# Patient Record
Sex: FEMALE | Race: WHITE | NOT HISPANIC OR LATINO | Employment: OTHER | URBAN - METROPOLITAN AREA
[De-identification: names, ages, dates, MRNs, and addresses within clinical notes are randomized per-mention and may not be internally consistent; named-entity substitution may affect disease eponyms.]

---

## 2018-11-01 ENCOUNTER — OFFICE VISIT (OUTPATIENT)
Dept: OBGYN | Facility: CLINIC | Age: 73
End: 2018-11-01
Payer: COMMERCIAL

## 2018-11-01 VITALS
WEIGHT: 137 LBS | SYSTOLIC BLOOD PRESSURE: 136 MMHG | BODY MASS INDEX: 23.39 KG/M2 | RESPIRATION RATE: 18 BRPM | DIASTOLIC BLOOD PRESSURE: 85 MMHG | HEIGHT: 64 IN | TEMPERATURE: 98.1 F | HEART RATE: 84 BPM

## 2018-11-01 DIAGNOSIS — N81.10 VAGINAL WALL PROLAPSE: Primary | ICD-10-CM

## 2018-11-01 LAB

## 2018-11-01 PROCEDURE — 51798 US URINE CAPACITY MEASURE: CPT | Performed by: OBSTETRICS & GYNECOLOGY

## 2018-11-01 PROCEDURE — 81001 URINALYSIS AUTO W/SCOPE: CPT | Performed by: OBSTETRICS & GYNECOLOGY

## 2018-11-01 PROCEDURE — 99203 OFFICE O/P NEW LOW 30 MIN: CPT | Mod: 25 | Performed by: OBSTETRICS & GYNECOLOGY

## 2018-11-01 PROCEDURE — 87086 URINE CULTURE/COLONY COUNT: CPT | Performed by: OBSTETRICS & GYNECOLOGY

## 2018-11-01 PROCEDURE — 57160 INSERT PESSARY/OTHER DEVICE: CPT | Performed by: OBSTETRICS & GYNECOLOGY

## 2018-11-01 PROCEDURE — A4562 PESSARY, NON RUBBER,ANY TYPE: HCPCS | Performed by: OBSTETRICS & GYNECOLOGY

## 2018-11-01 RX ORDER — AMLODIPINE BESYLATE 5 MG/1
7.5 TABLET ORAL
COMMUNITY
Start: 2017-08-22

## 2018-11-01 RX ORDER — GLUCOSAMINE/CHONDR SU A SOD 750-600 MG
3000 TABLET ORAL
COMMUNITY
Start: 2017-08-22

## 2018-11-01 RX ORDER — DIPHENOXYLATE HYDROCHLORIDE AND ATROPINE SULFATE 2.5; .025 MG/1; MG/1
1 TABLET ORAL
COMMUNITY
Start: 2018-02-20

## 2018-11-01 NOTE — PROGRESS NOTES
Paz is a 73 year old   female who presents for advice regarding likely vaginal wall prolapse; she has a remote h/o some type of vaginal sling for incontinence, is a breast cancer pt, who has just finished 10 years of antiestrogen meds; she now is feeling tremendouss pressure, sees something protruding when upright; she has no incontinence, has trouble emptying her bladder; no nocturia, no urgency; she is not sexually active..    There are no active problems to display for this patient.      All systems were reviewed and pertinent information in noted in subjective/HPI.    History reviewed. No pertinent past medical history.    History reviewed. No pertinent surgical history.      Current Outpatient Prescriptions:      amLODIPine (NORVASC) 5 MG tablet, Take 7.5 mg by mouth, Disp: , Rfl:      CALCIUM-MAGNESIUM-ZINC PO, Take 1 tablet by mouth, Disp: , Rfl:      Glucosamine HCl 1500 MG TABS, Take 3,000 mg by mouth, Disp: , Rfl:      Multiple Vitamin (MULTI-VITAMINS) TABS, Take 1 tablet by mouth, Disp: , Rfl:      ALENDRONATE SODIUM PO, Take by mouth once a week On Saturday, Disp: , Rfl:      ciprofloxacin (CIPRO) 500 MG tablet, Take 1 tablet (500 mg) by mouth 2 times daily (Patient not taking: Reported on 2018), Disp: 6 tablet, Rfl: 0     Omega-3 Fatty Acids (OMEGA-3 FISH OIL PO), Take 1 g by mouth daily, Disp: , Rfl:      ondansetron (ZOFRAN ODT) 4 MG disintegrating tablet, Take 1 tablet (4 mg) by mouth every 8 hours as needed for nausea (Patient not taking: Reported on 2018), Disp: 7 tablet, Rfl: 0     TAMOXIFEN CITRATE PO, Take 20 mg by mouth daily , Disp: , Rfl:     ALLERGIES:  Atorvastatin; Ibuprofen; and Lisinopril    Social History     Social History     Marital status:      Spouse name: N/A     Number of children: N/A     Years of education: N/A     Social History Main Topics     Smoking status: Never Smoker     Smokeless tobacco: Never Used     Alcohol use None     Drug use: None      "Sexual activity: Not Asked     Other Topics Concern     None     Social History Narrative     None       History reviewed. No pertinent family history.    OBJECTIVE:  Vitals: /85 (BP Location: Left arm, Patient Position: Chair, Cuff Size: Adult Small)  Pulse 84  Temp 98.1  F (36.7  C) (Tympanic)  Resp 18  Ht 5' 4\" (1.626 m)  Wt 137 lb (62.1 kg)  BMI 23.52 kg/m2 BMI= Body mass index is 23.52 kg/(m^2).   No LMP recorded. Patient is postmenopausal.     GENERAL APPEARANCE: healthy, alert and no distress  ABDOMEN:  soft, nontender, no hepato-splenomegaly or hernias   PELVIC:  Urethra with small caruncle noted; atrophic tissue  With strain, 3+ cystocele; apex well supported  1+ rectocele    POSTVOID RESIDUAL= 458 ml    Fit with size 3 ring with support pessary that felt very comfortable, reduced the anterior prolapse and stayed in place with Valsalva and movement    ASSESSMENT:      ICD-10-CM    1. Vaginal wall prolapse N81.10        PLAN:  I discussed pessary use, need for interval cleaning every 3 months, potential use of Trimosan gel  I recommend she RETURN TO CLINIC in 1 weeks for recheck of POSTVOID RESIDUAL and symptoms  Tasha Simmons MD  [facility]  Duration of visit:  30 minutes, >50% in discussion of current issues, treatment options and treatment planning.  TASHA Simmons MD    "

## 2018-11-01 NOTE — MR AVS SNAPSHOT
"              After Visit Summary   11/1/2018    Paz Workman    MRN: [id number]           Patient Information     Date Of Birth          1945        Visit Information        Provider Department      11/1/2018 9:30 AM Viviana Simmons MD [facility]        Today's Diagnoses     Vaginal wall prolapse    -  1       Follow-ups after your visit        Your next 10 appointments already scheduled     Nov 08, 2018 11:30 AM CST   SHORT with Viviana Simmons MD   [facility]    [address] [address]   [phone]              Who to contact     If you have questions or need follow up information about today's clinic visit or your schedule please contact [facility] directly at [phone].  Normal or non-critical lab and imaging results will be communicated to you by Fluentifyhart, letter or phone within 4 business days after the clinic has received the results. If you do not hear from us within 7 days, please contact the clinic through MyChart or phone. If you have a critical or abnormal lab result, we will notify you by phone as soon as possible.  Submit refill requests through SimScale or call your pharmacy and they will forward the refill request to us. Please allow 3 business days for your refill to be completed.          Additional Information About Your Visit        MyChart Information     SimScale lets you send messages to your doctor, view your test results, renew your prescriptions, schedule appointments and more. To sign up, go to [email or web address] . Click on \"Log in\" on the left side of the screen, which will take you to the Welcome page. Then click on \"Sign up Now\" on the right side of the page.     You will be asked to enter the access code listed below, as well as some personal information. Please follow the directions to create your username and password.     Your access code is: " "[email or web address]  Expires: 2019  9:02 AM     Your access code will  in 90 days. If you need help or a new code, please call your [place] clinic or [phone].        Care EveryWhere ID     This is your Care EveryWhere ID. This could be used by other organizations to access your [place] medical records  [id number]        Your Vitals Were     Pulse Temperature Respirations Height BMI (Body Mass Index)       84 98.1  F (36.7  C) (Tympanic) 18 5' 4\" (1.626 m) 23.52 kg/m2        Blood Pressure from Last 3 Encounters:   18 136/85   16 (!) 177/105    Weight from Last 3 Encounters:   18 137 lb (62.1 kg)              We Performed the Following     *UA reflex to Microscopic     FIT/INSERT INTRAVAG SUPPORT DEVICE/PESSARY     MEASURE POST-VOID RESIDUAL URINE/BLADDER CAPACITY, US NON-IMAGING     PESSARY, NON RUBBER,ANY TYPE     Urine Culture Aerobic Bacterial     Urine Microscopic        Primary Care Provider Office Phone # Fax #    Radha Tova Owens [phone]       [facility] [address] [address]        Equal Access to Services     SAMUEL SMALL : Hadii brayan cardenaso Soamish, waaxda luericaadaha, qaybta kaalmada adeegyada, viktoriya orourke. So [facility] [phone].    ATENCIÓN: Si habla español, tiene a vasquez disposición servicios gratuitos de asistencia lingüística. Llame al [phone].    We comply with applicable federal civil rights laws and [place] laws. We do not discriminate on the basis of race, color, national origin, age, disability, sex, sexual orientation, or gender identity.            Thank you!     Thank you for choosing [facility]  for your care. Our goal is always to provide you with excellent care. Hearing back from our patients is one way we can continue to improve our services. Please take a few minutes to complete the written survey that you may receive in the mail after your visit with " us. Thank you!             Your Updated Medication List - Protect others around you: Learn how to safely use, store and throw away your medicines at www.disposemymeds.org.          This list is accurate as of 11/1/18 10:11 AM.  Always use your most recent med list.                   Brand Name Dispense Instructions for use Diagnosis    ALENDRONATE SODIUM PO      Take by mouth once a week On Saturday        amLODIPine 5 MG tablet    NORVASC     Take 7.5 mg by mouth        CALCIUM-MAGNESIUM-ZINC PO      Take 1 tablet by mouth        ciprofloxacin 500 MG tablet    CIPRO    6 tablet    Take 1 tablet (500 mg) by mouth 2 times daily        Glucosamine HCl 1500 MG Tabs      Take 3,000 mg by mouth        MULTI-VITAMINS Tabs      Take 1 tablet by mouth        OMEGA-3 FISH OIL PO      Take 1 g by mouth daily        ondansetron 4 MG ODT tab    ZOFRAN ODT    7 tablet    Take 1 tablet (4 mg) by mouth every 8 hours as needed for nausea        TAMOXIFEN CITRATE PO      Take 20 mg by mouth daily

## 2018-11-02 LAB
BACTERIA SPEC CULT: NORMAL
Lab: NORMAL
SPECIMEN SOURCE: NORMAL

## 2018-11-08 ENCOUNTER — TELEPHONE (OUTPATIENT)
Dept: OBGYN | Facility: CLINIC | Age: 73
End: 2018-11-08

## 2018-11-08 ENCOUNTER — OFFICE VISIT (OUTPATIENT)
Dept: OBGYN | Facility: CLINIC | Age: 73
End: 2018-11-08
Payer: COMMERCIAL

## 2018-11-08 VITALS
HEART RATE: 75 BPM | TEMPERATURE: 98 F | BODY MASS INDEX: 23.39 KG/M2 | DIASTOLIC BLOOD PRESSURE: 79 MMHG | RESPIRATION RATE: 18 BRPM | HEIGHT: 64 IN | SYSTOLIC BLOOD PRESSURE: 134 MMHG | WEIGHT: 137 LBS

## 2018-11-08 DIAGNOSIS — Z46.89 PESSARY MAINTENANCE: Primary | ICD-10-CM

## 2018-11-08 PROCEDURE — 99212 OFFICE O/P EST SF 10 MIN: CPT | Performed by: OBSTETRICS & GYNECOLOGY

## 2018-11-08 RX ORDER — ESTRADIOL 0.5 MG/1
TABLET ORAL
Qty: 24 TABLET | Refills: 3 | Status: SHIPPED | OUTPATIENT
Start: 2018-11-08 | End: 2019-02-08

## 2018-11-08 NOTE — MR AVS SNAPSHOT
"              After Visit Summary   2018    Paz Workman    MRN: [id number]           Patient Information     Date Of Birth          1945        Visit Information        Provider Department      2018 11:30 AM Viviana Simmons MD [facility]        Today's Diagnoses     Pessary maintenance    -  1       Follow-ups after your visit        Who to contact     If you have questions or need follow up information about today's clinic visit or your schedule please contact [facility] directly at [phone].  Normal or non-critical lab and imaging results will be communicated to you by Relevant e-solutionhart, letter or phone within 4 business days after the clinic has received the results. If you do not hear from us within 7 days, please contact the clinic through Relevant e-solutionhart or phone. If you have a critical or abnormal lab result, we will notify you by phone as soon as possible.  Submit refill requests through Crazy eCommerce or call your pharmacy and they will forward the refill request to us. Please allow 3 business days for your refill to be completed.          Additional Information About Your Visit        MyChart Information     [employer] e[employer] lets you send messages to your doctor, view your test results, renew your prescriptions, schedule appointments and more. To sign up, go to [email or web address] . Click on \"Log in\" on the left side of the screen, which will take you to the Welcome page. Then click on \"Sign up Now\" on the right side of the page.     You will be asked to enter the access code listed below, as well as some personal information. Please follow the directions to create your username and password.     Your access code is: [email or web address]  Expires: 2019  8:02 AM     Your access code will  in 90 days. If you need help or a new code, please call your [facility] or [phone].        Care EveryWhere ID     This is your Care EveryWhere ID. This could be used by " "other organizations to access your [place] medical records  [id number]        Your Vitals Were     Pulse Temperature Respirations Height BMI (Body Mass Index)       75 98  F (36.7  C) (Tympanic) 18 5' 4\" (1.626 m) 23.52 kg/m2        Blood Pressure from Last 3 Encounters:   11/08/18 134/79   11/01/18 136/85   07/02/16 (!) 177/105    Weight from Last 3 Encounters:   11/08/18 137 lb (62.1 kg)   11/01/18 137 lb (62.1 kg)              We Performed the Following     MEASURE POST-VOID RESIDUAL URINE/BLADDER CAPACITY, US NON-IMAGING          Today's Medication Changes          These changes are accurate as of 11/8/18 11:58 AM.  If you have any questions, ask your nurse or doctor.               Start taking these medicines.        Dose/Directions    estradiol 0.5 MG tablet   Commonly known as:  ESTRACE   Used for:  Pessary maintenance   Started by:  Viviana Simmons MD        1 tab vaginally at bedtime twice a week   Quantity:  24 tablet   Refills:  3            Where to get your medicines      These medications were sent to [facility] [address] [place] [address] [address]   [address] [facility] [address]     Phone:  [phone]     estradiol 0.5 MG tablet                Primary Care Provider Office Phone # Fax #    Radha Tova Owens [phone]       [facility] [address] [facility] [facility] [address]        Equal Access to Services     SAMUEL SMALL AH: Glenny Owen, waaxda luqadaha, qaybta kaalmada brandonyakaushik schmidtjose g allan orourke. So [facility] [phone].    ATENCIÓN: Si habla español, tiene a vasquez disposición servicios gratuitos de asistencia lingüística. Llame al [phone].    We comply with applicable federal civil rights laws and [place] laws. We do not discriminate on the basis of race, color, national origin, age, disability, sex, sexual orientation, or gender identity.            Thank you!     Thank you for " choosing [facility]  for your care. Our goal is always to provide you with excellent care. Hearing back from our patients is one way we can continue to improve our services. Please take a few minutes to complete the written survey that you may receive in the mail after your visit with us. Thank you!             Your Updated Medication List - Protect others around you: Learn how to safely use, store and throw away your medicines at www.disposemymeds.org.          This list is accurate as of 11/8/18 11:58 AM.  Always use your most recent med list.                   Brand Name Dispense Instructions for use Diagnosis    ALENDRONATE SODIUM PO      Take by mouth once a week On Saturday        amLODIPine 5 MG tablet    NORVASC     Take 7.5 mg by mouth        CALCIUM-MAGNESIUM-ZINC PO      Take 1 tablet by mouth        ciprofloxacin 500 MG tablet    CIPRO    6 tablet    Take 1 tablet (500 mg) by mouth 2 times daily        estradiol 0.5 MG tablet    ESTRACE    24 tablet    1 tab vaginally at bedtime twice a week    Pessary maintenance       Glucosamine HCl 1500 MG Tabs      Take 3,000 mg by mouth        MULTI-VITAMINS Tabs      Take 1 tablet by mouth        OMEGA-3 FISH OIL PO      Take 1 g by mouth daily        ondansetron 4 MG ODT tab    ZOFRAN ODT    7 tablet    Take 1 tablet (4 mg) by mouth every 8 hours as needed for nausea        TAMOXIFEN CITRATE PO      Take 20 mg by mouth daily

## 2018-11-08 NOTE — PROGRESS NOTES
Paz is a 73 year old   female who presents for pessary recheck;  she currently has a ring with support pessary which she keeps in place continuously for past week;  she reports no problems with her pessary, no discharge, no bleeding.. She is not using vaginal estrogen  a week. She reports her voiding is MUCH better    All systems were reviewed and pertinent information in noted in subjective/HPI.    No past medical history on file.    No past surgical history on file.    Current Outpatient Prescriptions   Medication Sig Dispense Refill     ALENDRONATE SODIUM PO Take by mouth once a week On Saturday       amLODIPine (NORVASC) 5 MG tablet Take 7.5 mg by mouth       CALCIUM-MAGNESIUM-ZINC PO Take 1 tablet by mouth       estradiol (ESTRACE) 0.5 MG tablet 1 tab vaginally at bedtime twice a week 24 tablet 3     Glucosamine HCl 1500 MG TABS Take 3,000 mg by mouth       Multiple Vitamin (MULTI-VITAMINS) TABS Take 1 tablet by mouth       Omega-3 Fatty Acids (OMEGA-3 FISH OIL PO) Take 1 g by mouth daily       TAMOXIFEN CITRATE PO Take 20 mg by mouth daily        ciprofloxacin (CIPRO) 500 MG tablet Take 1 tablet (500 mg) by mouth 2 times daily (Patient not taking: Reported on 2018) 6 tablet 0     ondansetron (ZOFRAN ODT) 4 MG disintegrating tablet Take 1 tablet (4 mg) by mouth every 8 hours as needed for nausea (Patient not taking: Reported on 2018) 7 tablet 0       Social History     Social History     Marital status:      Spouse name: N/A     Number of children: N/A     Years of education: N/A     Social History Main Topics     Smoking status: Never Smoker     Smokeless tobacco: Never Used     Alcohol use None     Drug use: None     Sexual activity: Not Asked     Other Topics Concern     None     Social History Narrative       No family history on file.    OBJECTIVE: /79 (BP Location: Left arm, Patient Position: Chair, Cuff Size: Adult Small)  Pulse 75  Temp 98  F (36.7  C) (Tympanic)  Resp  "18  Ht 5' 4\" (1.626 m)  Wt 137 lb (62.1 kg)  BMI 23.52 kg/m2  No LMP recorded. Patient is postmenopausal.  The pessary was removed without difficulty, cleaned in warm water and then replaced, after inspection of the vulva and vagina, which showed erosion.    ASSESSMENT:     ICD-10-CM    1. Pessary maintenance Z46.89 MEASURE POST-VOID RESIDUAL URINE/BLADDER CAPACITY, US NON-IMAGING     estradiol (ESTRACE) 0.5 MG tablet         PLAN: RETURN TO CLINIC every 3 months for pessary maintennace  Recommend low dose vaginal estradiol to avoid erosions  Estradiol 0.5mg PV at bedtime twice a week  Viviana Simmons MD  [facility]      Viviana Simmons MD      "

## 2018-11-08 NOTE — TELEPHONE ENCOUNTER
Prior Authorization Retail Medication Request    Medication/Dose: Estradiol 0.5mg  ICD code (if different than what is on RX):  Pessary maintenance [Z46.89]  - Primary   Previously Tried and Failed:  Recommend low dose vaginal estradiol to avoid erosions  Estradiol 0.5mg PV at bedtime twice a week  Rationale:      Insurance Name:  Medicare-RX  Insurance ID:  [id number] Group ID [id number]      Pharmacy Information (if different than what is on RX)  Name:  [facility] [place]  Phone:  [phone]

## 2018-11-08 NOTE — TELEPHONE ENCOUNTER
PA Initiation    Medication: Estradiol 0.5mg  Insurance Company: [employer] - Phone [phone] Fax [phone]  Pharmacy Filling the Rx: [facility] [address] [place] [address] [address]  Filling Pharmacy Phone: [phone]  Filling Pharmacy Fax:    Start Date: 11/8/2018    THIS HAS BEEN SUBMITTED BY THE PRIOR-AUTHORIZATION TEAM. ANY QUESTIONS PLEASE CALL [phone]. THANK YOU

## 2018-11-09 NOTE — TELEPHONE ENCOUNTER
PRIOR AUTHORIZATION DENIED    Medication: Estradiol 0.5mg denied     Denial Date: 11/8/2018    Denial Rational: Per call to [employer] patient must try/fail Estace Vaginal Cream or Estradiol valerate          Appeal Information: IF YOU WOULD LIKE TO APPEAL, PLEASE SUPPLY PA TEAM WITH A LETTER OF MEDICAL NECESSITY.

## 2018-11-12 NOTE — TELEPHONE ENCOUNTER
Pt called reviewed letter.  Patient called to clarify how many mg's of Calcium Dr. Sewell recommend she take.    Pt notified per letter md is recommending 2000 mg of Calcium daily.   Spoke to pt to inform - pt states it only cost $4 so she just paid for it out of pocket.    -Aparna STRANGE [facility]  Clinic Station

## 2018-11-15 ENCOUNTER — TELEPHONE (OUTPATIENT)
Dept: OBGYN | Facility: CLINIC | Age: 73
End: 2018-11-15

## 2018-11-15 NOTE — TELEPHONE ENCOUNTER
"Reason for call:  Patient reporting a symptom    Symptom or request: Pt calling states estradiol was not covered by insurance - so she paid cash for it.  But now received a letter from her insurance stating why they wont cover it because \"it isn't safe for people over the age of 65\"  Pt concerned.    Have you been treated for this before? No    Additional comments:     Phone Number patient can be reached at:  Home number on file [phone] (home)    Best Time:  any    Can we leave a detailed message on this number:  YES    Call taken on 11/15/2018 at 3:52 PM by Aparna Burgos    "

## 2018-11-15 NOTE — TELEPHONE ENCOUNTER
"  [facility] Triage Call  Presenting Problem:\"I am returning a call from the clinic regarding a medication.\"   Patient Recommendations/Teaching:Advised to call in am when open.   Ni Richardson RN [facility]            "

## 2018-11-16 NOTE — TELEPHONE ENCOUNTER
Patient returning call to clinic.  Patient reassured.  Using Estradiol vaginally two times per week is recommended by Dr. Simmons.  Pt reports understanding.    Shruti Sanchez   [facility]Gyn Clinic  RN

## 2019-02-08 ENCOUNTER — OFFICE VISIT (OUTPATIENT)
Dept: OBGYN | Facility: CLINIC | Age: 74
End: 2019-02-08
Payer: COMMERCIAL

## 2019-02-08 VITALS
HEIGHT: 64 IN | DIASTOLIC BLOOD PRESSURE: 85 MMHG | HEART RATE: 77 BPM | BODY MASS INDEX: 23.73 KG/M2 | TEMPERATURE: 96.6 F | WEIGHT: 139 LBS | SYSTOLIC BLOOD PRESSURE: 143 MMHG | RESPIRATION RATE: 18 BRPM

## 2019-02-08 DIAGNOSIS — Z46.89 PESSARY MAINTENANCE: Primary | ICD-10-CM

## 2019-02-08 PROBLEM — I10 BENIGN ESSENTIAL HYPERTENSION: Status: ACTIVE | Noted: 2019-02-08

## 2019-02-08 PROBLEM — Z17.0 MALIGNANT NEOPLASM OF UPPER-OUTER QUADRANT OF BREAST IN FEMALE, ESTROGEN RECEPTOR POSITIVE (H): Status: ACTIVE | Noted: 2019-02-08

## 2019-02-08 PROBLEM — C50.419 MALIGNANT NEOPLASM OF UPPER-OUTER QUADRANT OF BREAST IN FEMALE, ESTROGEN RECEPTOR POSITIVE (H): Status: ACTIVE | Noted: 2019-02-08

## 2019-02-08 PROCEDURE — 99213 OFFICE O/P EST LOW 20 MIN: CPT | Performed by: OBSTETRICS & GYNECOLOGY

## 2019-02-08 ASSESSMENT — MIFFLIN-ST. JEOR: SCORE: 1115.5

## 2019-02-08 NOTE — PROGRESS NOTES
Paz is a 74 year old   female who presents for pessary recheck;  she currently has a ring with support pessary which she keeps in place continuously;  she reports no problems with her pessary, no discharge, no bleeding.. She is not using vaginal estrogen  a week because she developed unexplained tinnitus.    All systems were reviewed and pertinent information in noted in subjective/HPI.    History reviewed. No pertinent past medical history.    History reviewed. No pertinent surgical history.    Current Outpatient Medications   Medication Sig Dispense Refill     amLODIPine (NORVASC) 5 MG tablet Take 7.5 mg by mouth       CALCIUM-MAGNESIUM-ZINC PO Take 1 tablet by mouth       Glucosamine HCl 1500 MG TABS Take 3,000 mg by mouth       Multiple Vitamin (MULTI-VITAMINS) TABS Take 1 tablet by mouth       Omega-3 Fatty Acids (OMEGA-3 FISH OIL PO) Take 1 g by mouth daily       ALENDRONATE SODIUM PO Take by mouth once a week On Saturday       ciprofloxacin (CIPRO) 500 MG tablet Take 1 tablet (500 mg) by mouth 2 times daily (Patient not taking: Reported on 2018) 6 tablet 0     estradiol (ESTRACE) 0.5 MG tablet 1 tab vaginally at bedtime twice a week (Patient not taking: Reported on 2019) 24 tablet 3     ondansetron (ZOFRAN ODT) 4 MG disintegrating tablet Take 1 tablet (4 mg) by mouth every 8 hours as needed for nausea (Patient not taking: Reported on 2018) 7 tablet 0     TAMOXIFEN CITRATE PO Take 20 mg by mouth daily          Social History     Socioeconomic History     Marital status:      Spouse name: None     Number of children: None     Years of education: None     Highest education level: None   Social Needs     Financial resource strain: None     Food insecurity - worry: None     Food insecurity - inability: None     Transportation needs - medical: None     Transportation needs - non-medical: None   Occupational History     None   Tobacco Use     Smoking status: Never Smoker     Smokeless  "tobacco: Never Used   Substance and Sexual Activity     Alcohol use: None     Drug use: None     Sexual activity: Not Currently     Birth control/protection: Post-menopausal   Other Topics Concern     Parent/sibling w/ CABG, MI or angioplasty before 65F 55M? Not Asked   Social History Narrative     None       History reviewed. No pertinent family history.    OBJECTIVE: /85 (BP Location: Left arm, Patient Position: Chair, Cuff Size: Adult Small)   Pulse 77   Temp 96.6  F (35.9  C) (Tympanic)   Resp 18   Ht 1.626 m (5' 4\")   Wt 63 kg (139 lb)   BMI 23.86 kg/m    No LMP recorded. Patient is postmenopausal.  The pessary was removed without difficulty, cleaned in warm water and then replaced, after inspection of the vulva and vagina, which showed no erosion.    ASSESSMENT:     ICD-10-CM    1. Pessary maintenance Z46.89          PLAN: RETURN TO CLINIC 3 months for pessary cleaning and inspection of vagina; ok to remain off Estradiol at this time  Viviana Simmons MD  [facility]      Viviana Simmons MD      "

## 2019-05-01 ENCOUNTER — TELEPHONE (OUTPATIENT)
Dept: OBGYN | Facility: CLINIC | Age: 74
End: 2019-05-01

## 2019-05-01 NOTE — TELEPHONE ENCOUNTER
Panel Management Review    Date of last visit with a [facility] provider: nettie on 2/819.  Date of next visit with a [facility] provider:  nettie on 5/10/19.    Health Maintenance List    Health Maintenance   Topic Date Due     HEPATITIS C SCREENING  01/01/1963     DTAP/TDAP/TD IMMUNIZATION (1 - Tdap) 01/01/1970     MAMMO SCREEN Q2 YR (SYSTEM ASSIGNED)  01/01/1985     LIPID SCREEN Q5 YR FEMALE (SYSTEM ASSIGNED)  01/01/1990     COLON CANCER SCREEN (SYSTEM ASSIGNED)  01/01/1995     ZOSTER IMMUNIZATION (1 of 2) 01/01/1995     ADVANCE DIRECTIVE PLANNING Q5 YRS  01/01/2000     MEDICARE ANNUAL WELLNESS VISIT  01/01/2010     FALL RISK ASSESSMENT  01/01/2010     DEXA SCAN SCREENING (SYSTEM ASSIGNED)  01/01/2010     PHQ-2  01/01/2019     INFLUENZA VACCINE (Season Ended) 09/01/2019     IPV IMMUNIZATION  Aged Out     MENINGITIS IMMUNIZATION  Aged Out       Composite cancer screening  Chart review shows that this patient is due/due soon for the following Mammogram  No results found for: PAP  No past surgical history on file.    Is hysterectomy listed in surgical history? No   Is mastectomy listed in surgical history? No     Summary:    Patient is due/failing the following:   Mammogram    Action needed: Patient needs office visit for mammogram.    Type of outreach:  Sent letter.      Staff Signature:  Domitila Demarco

## 2019-05-01 NOTE — LETTER
May 1, 2019      Paz Workman  [address] [place] [id number]    Dear MsJavierJi,      This letter is to remind you that you are due for your mammogram.    Please call [phone] to schedule your appointment at your earliest convenience.     If you have completed the tests outside of [place], please have the results forwarded to our office. We will update the chart for your primary Physician to review before your next annual physical.     Sincerely,      Your [place] Care Team

## 2019-05-10 ENCOUNTER — OFFICE VISIT (OUTPATIENT)
Dept: OBGYN | Facility: CLINIC | Age: 74
End: 2019-05-10
Payer: COMMERCIAL

## 2019-05-10 VITALS
HEART RATE: 71 BPM | WEIGHT: 138 LBS | HEIGHT: 64 IN | TEMPERATURE: 96.8 F | RESPIRATION RATE: 18 BRPM | DIASTOLIC BLOOD PRESSURE: 81 MMHG | BODY MASS INDEX: 23.56 KG/M2 | SYSTOLIC BLOOD PRESSURE: 136 MMHG

## 2019-05-10 DIAGNOSIS — Z46.89 PESSARY MAINTENANCE: Primary | ICD-10-CM

## 2019-05-10 PROCEDURE — 99213 OFFICE O/P EST LOW 20 MIN: CPT | Performed by: OBSTETRICS & GYNECOLOGY

## 2019-05-10 ASSESSMENT — MIFFLIN-ST. JEOR: SCORE: 1110.96

## 2019-05-10 NOTE — PROGRESS NOTES
Paz is a 74 year old   female who presents for pessary recheck;  she currently has a ring with support pessary which she keeps in place continuously;  she reports no problems with her pessary, no discharge, no bleeding.. She is not using vaginal estrogen  a week due to tinnitus    All systems were reviewed and pertinent information in noted in subjective/HPI.    History reviewed. No pertinent past medical history.    History reviewed. No pertinent surgical history.    Current Outpatient Medications   Medication Sig Dispense Refill     amLODIPine (NORVASC) 5 MG tablet Take 7.5 mg by mouth       CALCIUM-MAGNESIUM-ZINC PO Take 1 tablet by mouth       Glucosamine HCl 1500 MG TABS Take 3,000 mg by mouth       Multiple Vitamin (MULTI-VITAMINS) TABS Take 1 tablet by mouth       ALENDRONATE SODIUM PO Take by mouth once a week On Saturday       Omega-3 Fatty Acids (OMEGA-3 FISH OIL PO) Take 1 g by mouth daily       TAMOXIFEN CITRATE PO Take 20 mg by mouth daily          Social History     Socioeconomic History     Marital status:      Spouse name: None     Number of children: None     Years of education: None     Highest education level: None   Occupational History     None   Social Needs     Financial resource strain: None     Food insecurity:     Worry: None     Inability: None     Transportation needs:     Medical: None     Non-medical: None   Tobacco Use     Smoking status: Never Smoker     Smokeless tobacco: Never Used   Substance and Sexual Activity     Alcohol use: None     Drug use: None     Sexual activity: Not Currently     Birth control/protection: Post-menopausal   Lifestyle     Physical activity:     Days per week: None     Minutes per session: None     Stress: None   Relationships     Social connections:     Talks on phone: None     Gets together: None     Attends Sabianist service: None     Active member of club or organization: None     Attends meetings of clubs or organizations: None      "Relationship status: None     Intimate partner violence:     Fear of current or ex partner: None     Emotionally abused: None     Physically abused: None     Forced sexual activity: None   Other Topics Concern     Parent/sibling w/ CABG, MI or angioplasty before 65F 55M? Not Asked   Social History Narrative     None       History reviewed. No pertinent family history.    OBJECTIVE: /81 (BP Location: Right arm, Patient Position: Chair, Cuff Size: Adult Small)   Pulse 71   Temp 96.8  F (36  C) (Tympanic)   Resp 18   Ht 1.626 m (5' 4\")   Wt 62.6 kg (138 lb)   BMI 23.69 kg/m    No LMP recorded. Patient is postmenopausal.  The pessary was removed without difficulty, cleaned in warm water and then replaced, after inspection of the vulva and vagina, which showed no erosions, normal leukorrhea.    ASSESSMENT:     ICD-10-CM    1. Pessary maintenance Z46.89          PLAN: RETURN TO CLINIC 3 mons for pessary cleaning and check    Viviana Simmons MD      "

## 2019-08-02 ENCOUNTER — OFFICE VISIT (OUTPATIENT)
Dept: OBGYN | Facility: CLINIC | Age: 74
End: 2019-08-02
Payer: COMMERCIAL

## 2019-08-02 VITALS
BODY MASS INDEX: 23.22 KG/M2 | TEMPERATURE: 98.9 F | DIASTOLIC BLOOD PRESSURE: 69 MMHG | HEIGHT: 64 IN | HEART RATE: 70 BPM | WEIGHT: 136 LBS | SYSTOLIC BLOOD PRESSURE: 136 MMHG | RESPIRATION RATE: 18 BRPM

## 2019-08-02 DIAGNOSIS — Z46.89 PESSARY MAINTENANCE: Primary | ICD-10-CM

## 2019-08-02 PROCEDURE — 99213 OFFICE O/P EST LOW 20 MIN: CPT | Performed by: OBSTETRICS & GYNECOLOGY

## 2019-08-02 ASSESSMENT — MIFFLIN-ST. JEOR: SCORE: 1101.89

## 2019-10-30 ENCOUNTER — OFFICE VISIT (OUTPATIENT)
Dept: OBGYN | Facility: CLINIC | Age: 74
End: 2019-10-30
Payer: COMMERCIAL

## 2019-10-30 VITALS
RESPIRATION RATE: 18 BRPM | WEIGHT: 137 LBS | SYSTOLIC BLOOD PRESSURE: 128 MMHG | TEMPERATURE: 98.2 F | DIASTOLIC BLOOD PRESSURE: 69 MMHG | HEART RATE: 72 BPM | BODY MASS INDEX: 23.39 KG/M2 | HEIGHT: 64 IN

## 2019-10-30 DIAGNOSIS — Z46.89 PESSARY MAINTENANCE: Primary | ICD-10-CM

## 2019-10-30 PROBLEM — N81.10 VAGINAL WALL PROLAPSE: Status: ACTIVE | Noted: 2018-11-01

## 2019-10-30 PROCEDURE — 99213 OFFICE O/P EST LOW 20 MIN: CPT | Performed by: OBSTETRICS & GYNECOLOGY

## 2019-10-30 ASSESSMENT — MIFFLIN-ST. JEOR: SCORE: 1106.43

## 2019-10-30 NOTE — PROGRESS NOTES
Paz is a 74 year old   female who presents for pessary recheck;  she currently has a ring with support pessary which she keeps in place continuously;  she reports no problems with her pessary, no discharge, no bleeding.. She is not using vaginal estrogen  a week.    All systems were reviewed and pertinent information in noted in subjective/HPI.    History reviewed. No pertinent past medical history.    History reviewed. No pertinent surgical history.    Current Outpatient Medications   Medication Sig Dispense Refill     ALENDRONATE SODIUM PO Take by mouth once a week On Saturday       amLODIPine (NORVASC) 5 MG tablet Take 7.5 mg by mouth       CALCIUM-MAGNESIUM-ZINC PO Take 1 tablet by mouth       Glucosamine HCl 1500 MG TABS Take 3,000 mg by mouth       Multiple Vitamin (MULTI-VITAMINS) TABS Take 1 tablet by mouth       Omega-3 Fatty Acids (OMEGA-3 FISH OIL PO) Take 1 g by mouth daily       TAMOXIFEN CITRATE PO Take 20 mg by mouth daily          Social History     Socioeconomic History     Marital status:      Spouse name: None     Number of children: None     Years of education: None     Highest education level: None   Occupational History     None   Social Needs     Financial resource strain: None     Food insecurity:     Worry: None     Inability: None     Transportation needs:     Medical: None     Non-medical: None   Tobacco Use     Smoking status: Never Smoker     Smokeless tobacco: Never Used   Substance and Sexual Activity     Alcohol use: None     Drug use: None     Sexual activity: Not Currently     Birth control/protection: Post-menopausal   Lifestyle     Physical activity:     Days per week: None     Minutes per session: None     Stress: None   Relationships     Social connections:     Talks on phone: None     Gets together: None     Attends Jew service: None     Active member of club or organization: None     Attends meetings of clubs or organizations: None     Relationship status:  "None     Intimate partner violence:     Fear of current or ex partner: None     Emotionally abused: None     Physically abused: None     Forced sexual activity: None   Other Topics Concern     Parent/sibling w/ CABG, MI or angioplasty before 65F 55M? Not Asked   Social History Narrative     None       History reviewed. No pertinent family history.    OBJECTIVE: /69 (BP Location: Left arm, Patient Position: Chair, Cuff Size: Adult Small)   Pulse 72   Temp 98.2  F (36.8  C) (Tympanic)   Resp 18   Ht 1.626 m (5' 4\")   Wt 62.1 kg (137 lb)   BMI 23.52 kg/m    No LMP recorded. Patient is postmenopausal.  The pessary was removed without difficulty, cleaned in warm water and then replaced, after inspection of the vulva and vagina, which showed thin sl green discharge; no erosions.    ASSESSMENT:     ICD-10-CM    1. Pessary maintenance Z46.89          PLAN: RETURN TO CLINIC 3 months for pessary removal, examination and replacement    Viviana Simmons MD      "

## 2019-10-30 NOTE — NURSING NOTE
"Initial /69 (BP Location: Left arm, Patient Position: Chair, Cuff Size: Adult Small)   Pulse 72   Temp 98.2  F (36.8  C) (Tympanic)   Resp 18   Ht 1.626 m (5' 4\")   Wt 62.1 kg (137 lb)   BMI 23.52 kg/m   Estimated body mass index is 23.52 kg/m  as calculated from the following:    Height as of this encounter: 1.626 m (5' 4\").    Weight as of this encounter: 62.1 kg (137 lb). .      "

## 2019-12-24 ENCOUNTER — TELEPHONE (OUTPATIENT)
Dept: OBGYN | Facility: CLINIC | Age: 74
End: 2019-12-24

## 2019-12-24 NOTE — TELEPHONE ENCOUNTER
Panel Management Review    Date of last visit with a [facility] provider: Iris Hassan on 10/30/19.  Date of next visit with a [facility] provider:   on 1/30/2020.    Health Maintenance List    Health Maintenance   Topic Date Due     DEXA  1945     HEPATITIS C SCREENING  1945     ADVANCE CARE PLANNING  1945     MAMMO SCREENING  1945     COLONOSCOPY  01/01/1955     DTAP/TDAP/TD IMMUNIZATION (1 - Tdap) 01/01/1956     LIPID  01/01/1990     ZOSTER IMMUNIZATION (1 of 2) 01/01/1995     MEDICARE ANNUAL WELLNESS VISIT  01/01/2010     FALL RISK ASSESSMENT  01/01/2010     PNEUMOCOCCAL IMMUNIZATION 65+ LOW/MEDIUM RISK (1 of 2 - PCV13) 01/01/2010     PHQ-2  01/01/2019     INFLUENZA VACCINE (1) 09/01/2019     IPV IMMUNIZATION  Aged Out     MENINGITIS IMMUNIZATION  Aged Out       Composite cancer screening  Chart review shows that this patient is due/due soon for the following Mammogram  No results found for: PAP  No past surgical history on file.    Is hysterectomy listed in surgical history? No   Is mastectomy listed in surgical history? No     Summary:    Patient is due/failing the following:   Mammogram    Action needed: Patient needs office visit for mammogram.    Type of outreach:  Sent letter.      Staff Signature:  Domitila Demarco CMA

## 2019-12-24 NOTE — LETTER
December 24, 2019      Paz Workman  [facility] [address]  [place] [address]    Dear ,      This letter is to remind you that you are due for your mammogram.    Please call [phone] to schedule your appointment at your earliest convenience.     If you have completed the tests outside of [place], please have the results forwarded to our office. We will update the chart for your primary Physician to review before your next annual physical.     Sincerely,      Your [place] Care Team

## 2020-01-30 ENCOUNTER — OFFICE VISIT (OUTPATIENT)
Dept: OBGYN | Facility: CLINIC | Age: 75
End: 2020-01-30
Payer: COMMERCIAL

## 2020-01-30 VITALS
HEART RATE: 68 BPM | SYSTOLIC BLOOD PRESSURE: 141 MMHG | RESPIRATION RATE: 16 BRPM | BODY MASS INDEX: 23.41 KG/M2 | WEIGHT: 137.1 LBS | TEMPERATURE: 97 F | HEIGHT: 64 IN | DIASTOLIC BLOOD PRESSURE: 70 MMHG

## 2020-01-30 DIAGNOSIS — Z46.89 PESSARY MAINTENANCE: Primary | ICD-10-CM

## 2020-01-30 PROCEDURE — 99213 OFFICE O/P EST LOW 20 MIN: CPT | Performed by: OBSTETRICS & GYNECOLOGY

## 2020-01-30 ASSESSMENT — MIFFLIN-ST. JEOR: SCORE: 1101.88

## 2020-01-30 NOTE — PROGRESS NOTES
Paz is a 75 year old   female who presents for pessary recheck;  she currently has a ring with support pessary which she keeps in place continuous;  she reports some problems with her pessary, some discharge and odor no bleeding.. She is not using vaginal estrogen  a week.    All systems were reviewed and pertinent information in noted in subjective/HPI.    History reviewed. No pertinent past medical history.    History reviewed. No pertinent surgical history.    Current Outpatient Medications   Medication Sig Dispense Refill     amLODIPine (NORVASC) 5 MG tablet Take 7.5 mg by mouth       CALCIUM-MAGNESIUM-ZINC PO Take 1 tablet by mouth       Glucosamine HCl 1500 MG TABS Take 3,000 mg by mouth       Omega-3 Fatty Acids (OMEGA-3 FISH OIL PO) Take 1 g by mouth daily       ALENDRONATE SODIUM PO Take by mouth once a week On Saturday       Multiple Vitamin (MULTI-VITAMINS) TABS Take 1 tablet by mouth         Social History     Socioeconomic History     Marital status:      Spouse name: None     Number of children: None     Years of education: None     Highest education level: None   Occupational History     None   Social Needs     Financial resource strain: None     Food insecurity:     Worry: None     Inability: None     Transportation needs:     Medical: None     Non-medical: None   Tobacco Use     Smoking status: Never Smoker     Smokeless tobacco: Never Used   Substance and Sexual Activity     Alcohol use: None     Drug use: None     Sexual activity: Not Currently     Birth control/protection: Post-menopausal   Lifestyle     Physical activity:     Days per week: None     Minutes per session: None     Stress: None   Relationships     Social connections:     Talks on phone: None     Gets together: None     Attends Scientologist service: None     Active member of club or organization: None     Attends meetings of clubs or organizations: None     Relationship status: None     Intimate partner violence:      "Fear of current or ex partner: None     Emotionally abused: None     Physically abused: None     Forced sexual activity: None   Other Topics Concern     Parent/sibling w/ CABG, MI or angioplasty before 65F 55M? Not Asked   Social History Narrative     None       History reviewed. No pertinent family history.    OBJECTIVE: BP (!) 141/70 (BP Location: Left arm, Patient Position: Chair, Cuff Size: Adult Regular)   Pulse 68   Temp 97  F (36.1  C) (Tympanic)   Resp 16   Ht 1.626 m (5' 4\")   Wt 62.2 kg (137 lb 1.6 oz)   Breastfeeding No   BMI 23.53 kg/m    No LMP recorded. Patient is postmenopausal.  The pessary was removed without difficulty, cleaned in warm water and then replaced, after inspection of the vulva and vagina, which showed some thin green leukorrhea; no erosions.    ASSESSMENT:     ICD-10-CM    1. Pessary maintenance Z46.89          PLAN: I recommend a trial of Trimo-martinez gel 1gm PV twice a week to see if can improve her experience with discharge and odor  F/u 3 months    Viviana Simmons MD      "

## 2020-01-30 NOTE — NURSING NOTE
"Initial BP (!) 141/70 (BP Location: Left arm, Patient Position: Chair, Cuff Size: Adult Regular)   Pulse 68   Temp 97  F (36.1  C) (Tympanic)   Resp 16   Ht 1.626 m (5' 4\")   Wt 62.2 kg (137 lb 1.6 oz)   Breastfeeding No   BMI 23.53 kg/m   Estimated body mass index is 23.53 kg/m  as calculated from the following:    Height as of this encounter: 1.626 m (5' 4\").    Weight as of this encounter: 62.2 kg (137 lb 1.6 oz). .    Daisha Gregorio, SANDRA    "

## 2020-03-16 ENCOUNTER — TELEPHONE (OUTPATIENT)
Dept: OBGYN | Facility: CLINIC | Age: 75
End: 2020-03-16

## 2020-03-16 DIAGNOSIS — N81.10 VAGINAL WALL PROLAPSE: Primary | ICD-10-CM

## 2020-03-16 DIAGNOSIS — Z46.89 PESSARY MAINTENANCE: ICD-10-CM

## 2020-03-16 RX ORDER — OXYQUINOLINE/BORIC ACID 0.025 %
1 JELLY WITH APPLICATOR (GRAM) VAGINAL
Qty: 113.4 G | Refills: 1 | Status: SHIPPED | OUTPATIENT
Start: 2020-03-16 | End: 2021-06-28

## 2020-03-16 NOTE — TELEPHONE ENCOUNTER
LOV 1/30/2020:  PLAN: I recommend a trial of Trimo-martinez gel 1gm PV twice a week to see if can improve her experience with discharge and odor  F/u 3 months  F/U visit scheduled for April 2020  Okay to reorder? Rx cued please sign if appropriate.     Sid SCALES RN   Specialty Clinics

## 2020-03-16 NOTE — TELEPHONE ENCOUNTER
Reason for Call:  Other     Detailed comments: Pt has been using Trimo-Jay since 01/30. She states it is helping but is not sure if she will have enough to last her until her next appt on 04/24 - Please advise    PHARMACY:  [facility]     Phone Number Patient can be reached at: Home number on file [phone] (home)    Best Time: Any    Can we leave a detailed message on this number? YES    Call taken on 3/16/2020 at 12:46 PM by Denise Behrendt

## 2020-03-19 NOTE — TELEPHONE ENCOUNTER
Peasant =pessary    Routing comment       Iris, Viviana Huerta MD  You 3 minutes ago (11:13 AM)      For now, we can give her a sample box from our peasant closet and discuss other options at her next appointment   Viviana Simmons    Routing comment

## 2020-03-19 NOTE — TELEPHONE ENCOUNTER
[facility]ed [facility]. PA not offered as an option. medication not covered. No alternative recommended by pharm.     Is there an alternative you recommend? Patient stated she cannot afford the cost of this medication    Thank you,   Sid SCALES RN   Specialty Clinics

## 2020-03-19 NOTE — TELEPHONE ENCOUNTER
Pt is calling and saying [facility] notified her that Trimo-Jay is not covered by her insurance and is requesting an alternative/generic prescription be sent.     If questions, contact pt @:  [phone] (ok to leave message)    Michaela Behrendt  Specialty CSS

## 2020-06-22 ENCOUNTER — TELEPHONE (OUTPATIENT)
Dept: OBGYN | Facility: CLINIC | Age: 75
End: 2020-06-22

## 2020-06-22 NOTE — TELEPHONE ENCOUNTER
Reason for Call:  Other UTI    Detailed comments: Pt thinks she has a UTI, frequent urination, may need some meds, please call      Phone Number Patient can be reached at: Home number on file [phone] (home)    Best Time: today    Can we leave a detailed message on this number? YES    Call taken on 6/22/2020 at 3:13 PM by Gifty Daniel

## 2020-06-22 NOTE — TELEPHONE ENCOUNTER
"Return call to patient.  Spoke with patient on the phone.    S-(situation): Patient reports going to the bathroom every 30 minutes. Patient reports symptoms started today. Patient reports she thinks she may have had a fever earlier this morning but this has now resolved. Patient reports similar symptoms about 3 years ago \" and a prescription cleared it all up.\"    B-(background): sees Dr. Simmons for [facility] PCP    A-(assessment): urinary frequency    R-(recommendations): Recommended appointment to be seen. Can do Urgent Care tonight if unable to wait for appointment tomorrow. Patient prefers appointment tomorrow.    Push oral fluids and stay well hydrated. Urgent Care with worsening symptoms.    Patient scheduled for telephone visit in FP tomorrow at 0800.    Patient reports understanding    Shruti Sanchez   [facility]  RN      "

## 2020-06-23 ENCOUNTER — VIRTUAL VISIT (OUTPATIENT)
Dept: FAMILY MEDICINE | Facility: CLINIC | Age: 75
End: 2020-06-23
Payer: COMMERCIAL

## 2020-06-23 DIAGNOSIS — N30.00 ACUTE CYSTITIS WITHOUT HEMATURIA: Primary | ICD-10-CM

## 2020-06-23 PROCEDURE — 99213 OFFICE O/P EST LOW 20 MIN: CPT | Mod: 95 | Performed by: NURSE PRACTITIONER

## 2020-06-23 RX ORDER — CIPROFLOXACIN 500 MG/1
500 TABLET, FILM COATED ORAL 2 TIMES DAILY
Qty: 6 TABLET | Refills: 0 | Status: SHIPPED | OUTPATIENT
Start: 2020-06-23 | End: 2020-06-26

## 2020-06-23 NOTE — PROGRESS NOTES
"Paz Workman is a 75 year old female who is being evaluated via a billable telephone visit.      The patient has been notified of following:     \"This telephone visit will be conducted via a call between you and your physician/provider. We have found that certain health care needs can be provided without the need for a physical exam.  This service lets us provide the care you need with a short phone conversation.  If a prescription is necessary we can send it directly to your pharmacy.  If lab work is needed we can place an order for that and you can then stop by our lab to have the test done at a later time.    Telephone visits are billed at different rates depending on your insurance coverage. During this emergency period, for some insurers they may be billed the same as an in-person visit.  Please reach out to your insurance provider with any questions.    If during the course of the call the physician/provider feels a telephone visit is not appropriate, you will not be charged for this service.\"    Patient has given verbal consent for Telephone visit?  Yes    What phone number would you like to be contacted at? [phone]    How would you like to obtain your AVS? Mail a copy    Subjective     Paz Workman is a 75 year old female who presents via phone visit today for the following health issues:    HPI  URINARY TRACT SYMPTOMS      Duration: started yesterday after she tried to clean her pessary Sunday.    Description  dysuria, frequency and urgency    Intensity:  moderate    Accompanying signs and symptoms:  Fever/chills: YES, face would get red and go away; not checking temperature  Flank pain no   Nausea and vomiting: no   Vaginal symptoms: none  Abdominal/Pelvic Pain: No    History  History of frequent UTI's: YES  History of kidney stones: no   Sexually Active: no   Possibility of pregnancy: No    Precipitating or alleviating factors: up every hour last night to urinate.    Therapies tried " and outcome: cranberry juice  and increase fluid intake   Outcome: helped a little    Patient feels that removing and replacing pessary is cause of UTI.  She has not been able to get into OB for 5 months and removed this, cleaned it and put it back in.  She had a hard time getting it back in and was wondering if someone would do this.    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 No pertinent surgical history.    Social History     Tobacco Use     Smoking status: Never Smoker     Smokeless tobacco: Never Used   Substance Use Topics     Alcohol use: Yes     Comment: occ     History reviewed. No pertinent family history.      Current Outpatient Medications   Medication Sig Dispense Refill     amLODIPine (NORVASC) 5 MG tablet Take 7.5 mg by mouth       CALCIUM-MAGNESIUM-ZINC PO Take 1 tablet by mouth       ciprofloxacin (CIPRO) 500 MG tablet Take 1 tablet (500 mg) by mouth 2 times daily for 3 days 6 tablet 0     Glucosamine HCl 1500 MG TABS Take 3,000 mg by mouth       Multiple Vitamin (MULTI-VITAMINS) TABS Take 1 tablet by mouth       Omega-3 Fatty Acids (OMEGA-3 FISH OIL PO) Take 1 g by mouth daily       Oxyquinolone sulfate (TRIMO-DAMON) 0.025 % GEL Place 1 g vaginally twice a week (Patient not taking: Reported on 6/23/2020) 113.4 g 1     Allergies   Allergen Reactions     Atorvastatin Muscle Pain (Myalgia)     Ibuprofen Swelling     Lisinopril Other (See Comments)       Reviewed and updated as needed this visit by Provider  Tobacco  Allergies  Meds  Problems  Med Hx  Surg Hx  Fam Hx         Review of Systems   CONSTITUTIONAL: NEGATIVE for fever, chills, change in weight  RESP: NEGATIVE for significant cough or SOB  CV: NEGATIVE for chest pain, palpitations or peripheral edema  : dysuria, frequency and urgency  PSYCHIATRIC: NEGATIVE  for changes in mood or affect  ROS otherwise negative       Objective   Reported vitals:  LMP  (LMP Unknown)    healthy, alert and no distress  PSYCH: Alert and oriented times 3; coherent speech, normal   rate and volume, able to articulate logical thoughts, able   to abstract reason, no tangential thoughts, no hallucinations   or delusions  Her affect is normal  RESP: No cough, no audible wheezing, able to talk in full sentences  Remainder of exam unable to be completed due to telephone visits    Diagnostic Test Results:  none         Assessment/Plan:  1. Acute cystitis without hematuria  Treating prophylactically with Cipro for 3 days.  Recommended to continue pushing fluids.  Patient declined pyridium.  Advised her to call if symptoms are not improving in the next 3 days to get UA completed for evaluation.  Recommended patient call to make appointment in September to have pessary cleaned and replaced.  - ciprofloxacin (CIPRO) 500 MG tablet; Take 1 tablet (500 mg) by mouth 2 times daily for 3 days  Dispense: 6 tablet; Refill: 0    Return in about 3 days (around 6/26/2020), or if symptoms worsen or fail to improve.      Phone call duration:  21 minutes    Hannah Cummings NP

## 2020-06-23 NOTE — PATIENT INSTRUCTIONS
1.  Continue to push fluids.  2.  Take Cipro twice daily for 3 days.  3.  Follow-up with a phone call to clinic if symptoms are not improving after a couple days.  I will order Urine sample to be obtained for evaluation and culture.

## 2020-09-14 ENCOUNTER — OFFICE VISIT (OUTPATIENT)
Dept: OBGYN | Facility: CLINIC | Age: 75
End: 2020-09-14
Payer: COMMERCIAL

## 2020-09-14 VITALS
HEIGHT: 64 IN | SYSTOLIC BLOOD PRESSURE: 127 MMHG | WEIGHT: 134 LBS | HEART RATE: 68 BPM | DIASTOLIC BLOOD PRESSURE: 66 MMHG | BODY MASS INDEX: 22.88 KG/M2 | TEMPERATURE: 97.6 F | RESPIRATION RATE: 18 BRPM

## 2020-09-14 DIAGNOSIS — Z46.89 PESSARY MAINTENANCE: Primary | ICD-10-CM

## 2020-09-14 PROCEDURE — 99213 OFFICE O/P EST LOW 20 MIN: CPT | Performed by: OBSTETRICS & GYNECOLOGY

## 2020-09-14 ASSESSMENT — MIFFLIN-ST. JEOR: SCORE: 1087.82

## 2020-09-14 NOTE — NURSING NOTE
"Initial /66 (BP Location: Left arm, Patient Position: Chair, Cuff Size: Adult Regular)   Pulse 68   Temp 97.6  F (36.4  C) (Tympanic)   Resp 18   Ht 1.626 m (5' 4\")   Wt 60.8 kg (134 lb)   LMP  (LMP Unknown)   BMI 23.00 kg/m   Estimated body mass index is 23 kg/m  as calculated from the following:    Height as of this encounter: 1.626 m (5' 4\").    Weight as of this encounter: 60.8 kg (134 lb). .      "

## 2020-09-14 NOTE — PROGRESS NOTES
Paz is a 75 year old   female who presents for pessary recheck;  she currently has a ring with support pessary which she keeps in place continuously7;  she reports no problems with her pessary, no discharge, no bleeding.. She is not using vaginal estrogen  a week. Due to personal h/o breast CA    All systems were reviewed and pertinent information in noted in subjective/HPI.    History reviewed. No pertinent past medical history.    History reviewed. No pertinent surgical history.    Current Outpatient Medications   Medication Sig Dispense Refill     amLODIPine (NORVASC) 5 MG tablet Take 7.5 mg by mouth       CALCIUM-MAGNESIUM-ZINC PO Take 1 tablet by mouth       Cranberry 600 MG TABS        Misc Natural Products (GLUCOSAMINE CHOND COMPLEX/MSM PO)        Omega-3 Fatty Acids (OMEGA-3 FISH OIL PO) Take 1 g by mouth daily       Glucosamine HCl 1500 MG TABS Take 3,000 mg by mouth       Multiple Vitamin (MULTI-VITAMINS) TABS Take 1 tablet by mouth       Oxyquinolone sulfate (TRIMO-DAMON) 0.025 % GEL Place 1 g vaginally twice a week (Patient not taking: Reported on 2020) 113.4 g 1       Social History     Socioeconomic History     Marital status:      Spouse name: None     Number of children: None     Years of education: None     Highest education level: None   Occupational History     None   Social Needs     Financial resource strain: None     Food insecurity     Worry: None     Inability: None     Transportation needs     Medical: None     Non-medical: None   Tobacco Use     Smoking status: Never Smoker     Smokeless tobacco: Never Used   Substance and Sexual Activity     Alcohol use: Yes     Comment: occ     Drug use: Never     Sexual activity: Not Currently     Birth control/protection: Post-menopausal   Lifestyle     Physical activity     Days per week: None     Minutes per session: None     Stress: None   Relationships     Social connections     Talks on phone: None     Gets together: None      "Attends Orthodoxy service: None     Active member of club or organization: None     Attends meetings of clubs or organizations: None     Relationship status: None     Intimate partner violence     Fear of current or ex partner: None     Emotionally abused: None     Physically abused: None     Forced sexual activity: None   Other Topics Concern     Parent/sibling w/ CABG, MI or angioplasty before 65F 55M? Not Asked   Social History Narrative     None       History reviewed. No pertinent family history.    OBJECTIVE: /66 (BP Location: Left arm, Patient Position: Chair, Cuff Size: Adult Regular)   Pulse 68   Temp 97.6  F (36.4  C) (Tympanic)   Resp 18   Ht 1.626 m (5' 4\")   Wt 60.8 kg (134 lb)   LMP  (LMP Unknown)   BMI 23.00 kg/m    No LMP recorded (lmp unknown). Patient is postmenopausal.  The pessary was removed without difficulty, cleaned in warm water and then replaced, after inspection of the vulva and vagina, which showed mild amount of sl green discharge.    ASSESSMENT:     ICD-10-CM    1. Pessary maintenance  Z46.89          PLAN: RETURN TO CLINIC every 3-4 mons for pessary cleaning; pt doees not like use of Trimosan  Viviana Simmons MD  [facility] [place]      Viviana Simmons MD      "

## 2020-12-15 ENCOUNTER — OFFICE VISIT (OUTPATIENT)
Dept: OBGYN | Facility: CLINIC | Age: 75
End: 2020-12-15
Payer: COMMERCIAL

## 2020-12-15 VITALS
SYSTOLIC BLOOD PRESSURE: 132 MMHG | WEIGHT: 133 LBS | TEMPERATURE: 98.3 F | BODY MASS INDEX: 22.71 KG/M2 | HEIGHT: 64 IN | DIASTOLIC BLOOD PRESSURE: 73 MMHG | HEART RATE: 70 BPM | RESPIRATION RATE: 18 BRPM

## 2020-12-15 DIAGNOSIS — Z46.89 PESSARY MAINTENANCE: Primary | ICD-10-CM

## 2020-12-15 PROCEDURE — 99213 OFFICE O/P EST LOW 20 MIN: CPT | Performed by: OBSTETRICS & GYNECOLOGY

## 2020-12-15 ASSESSMENT — MIFFLIN-ST. JEOR: SCORE: 1083.28

## 2020-12-15 NOTE — PROGRESS NOTES
Paz is a 75 year old   female who presents for pessary recheck;  she currently has a ring with support pessary which she keeps in place continuously;  she reports no problems with her pessary, no discharge, no bleeding.. She is not using vaginal estrogen  a week.    All systems were reviewed and pertinent information in noted in subjective/HPI.    History reviewed. No pertinent past medical history.    History reviewed. No pertinent surgical history.    Current Outpatient Medications   Medication Sig Dispense Refill     amLODIPine (NORVASC) 5 MG tablet Take 7.5 mg by mouth       CALCIUM-MAGNESIUM-ZINC PO Take 1 tablet by mouth       Cranberry 600 MG TABS        Glucosamine HCl 1500 MG TABS Take 3,000 mg by mouth       Misc Natural Products (GLUCOSAMINE CHOND COMPLEX/MSM PO)        Multiple Vitamin (MULTI-VITAMINS) TABS Take 1 tablet by mouth       Omega-3 Fatty Acids (OMEGA-3 FISH OIL PO) Take 1 g by mouth daily       Oxyquinolone sulfate (TRIMO-DAMON) 0.025 % GEL Place 1 g vaginally twice a week (Patient not taking: Reported on 2020) 113.4 g 1       Social History     Socioeconomic History     Marital status:      Spouse name: None     Number of children: None     Years of education: None     Highest education level: None   Occupational History     None   Social Needs     Financial resource strain: None     Food insecurity     Worry: None     Inability: None     Transportation needs     Medical: None     Non-medical: None   Tobacco Use     Smoking status: Never Smoker     Smokeless tobacco: Never Used   Substance and Sexual Activity     Alcohol use: Yes     Comment: occ     Drug use: Never     Sexual activity: Not Currently     Birth control/protection: Post-menopausal   Lifestyle     Physical activity     Days per week: None     Minutes per session: None     Stress: None   Relationships     Social connections     Talks on phone: None     Gets together: None     Attends Gnosticist service: None      "Active member of club or organization: None     Attends meetings of clubs or organizations: None     Relationship status: None     Intimate partner violence     Fear of current or ex partner: None     Emotionally abused: None     Physically abused: None     Forced sexual activity: None   Other Topics Concern     Parent/sibling w/ CABG, MI or angioplasty before 65F 55M? Not Asked   Social History Narrative     None       History reviewed. No pertinent family history.    OBJECTIVE: /73 (BP Location: Left arm, Patient Position: Chair, Cuff Size: Adult Regular)   Pulse 70   Temp 98.3  F (36.8  C) (Tympanic)   Resp 18   Ht 1.626 m (5' 4\")   Wt 60.3 kg (133 lb)   LMP  (LMP Unknown)   Breastfeeding No   BMI 22.83 kg/m    No LMP recorded (lmp unknown). Patient is postmenopausal.  The pessary was removed without difficulty, cleaned in warm water and then replaced, after inspection of the vulva and vagina, which showed no erosions.    ASSESSMENT:     ICD-10-CM    1. Pessary maintenance  Z46.89          PLAN: RETURN TO CLINIC 3-4 months for pessary cleaning; use Trimosan if experiencing discharge    Viviana Simmons MD      "

## 2020-12-15 NOTE — NURSING NOTE
"Initial /73 (BP Location: Left arm, Patient Position: Chair, Cuff Size: Adult Regular)   Pulse 70   Temp 98.3  F (36.8  C) (Tympanic)   Resp 18   Ht 1.626 m (5' 4\")   Wt 60.3 kg (133 lb)   LMP  (LMP Unknown)   Breastfeeding No   BMI 22.83 kg/m   Estimated body mass index is 22.83 kg/m  as calculated from the following:    Height as of this encounter: 1.626 m (5' 4\").    Weight as of this encounter: 60.3 kg (133 lb). .      "

## 2021-03-30 ENCOUNTER — OFFICE VISIT (OUTPATIENT)
Dept: OBGYN | Facility: CLINIC | Age: 76
End: 2021-03-30
Payer: COMMERCIAL

## 2021-03-30 VITALS
BODY MASS INDEX: 21.85 KG/M2 | HEIGHT: 64 IN | RESPIRATION RATE: 18 BRPM | TEMPERATURE: 98.3 F | SYSTOLIC BLOOD PRESSURE: 128 MMHG | WEIGHT: 128 LBS | HEART RATE: 71 BPM | DIASTOLIC BLOOD PRESSURE: 69 MMHG

## 2021-03-30 DIAGNOSIS — Z46.89 PESSARY MAINTENANCE: Primary | ICD-10-CM

## 2021-03-30 PROCEDURE — 99213 OFFICE O/P EST LOW 20 MIN: CPT | Performed by: OBSTETRICS & GYNECOLOGY

## 2021-03-30 ASSESSMENT — MIFFLIN-ST. JEOR: SCORE: 1055.6

## 2021-03-30 NOTE — PROGRESS NOTES
Paz is a 76 year old   female who presents for pessary recheck;  she currently has a ring w support pessary which she keeps in place continuously;  she reports no problems with her pessary, no discharge, no bleeding.. She is not using vaginal estrogen or Trimosan a week.    All systems were reviewed and pertinent information in noted in subjective/HPI.    History reviewed. No pertinent past medical history.    History reviewed. No pertinent surgical history.    Current Outpatient Medications   Medication Sig Dispense Refill     amLODIPine (NORVASC) 5 MG tablet Take 7.5 mg by mouth       CALCIUM-MAGNESIUM-ZINC PO Take 1 tablet by mouth       Cranberry 600 MG TABS        Misc Natural Products (GLUCOSAMINE CHOND COMPLEX/MSM PO)        Multiple Vitamin (MULTI-VITAMINS) TABS Take 1 tablet by mouth       Omega-3 Fatty Acids (OMEGA-3 FISH OIL PO) Take 1 g by mouth daily       Glucosamine HCl 1500 MG TABS Take 3,000 mg by mouth       Oxyquinolone sulfate (TRIMO-DAMON) 0.025 % GEL Place 1 g vaginally twice a week (Patient not taking: Reported on 3/30/2021) 113.4 g 1       Social History     Socioeconomic History     Marital status:      Spouse name: None     Number of children: None     Years of education: None     Highest education level: None   Occupational History     None   Social Needs     Financial resource strain: None     Food insecurity     Worry: None     Inability: None     Transportation needs     Medical: None     Non-medical: None   Tobacco Use     Smoking status: Never Smoker     Smokeless tobacco: Never Used   Substance and Sexual Activity     Alcohol use: Yes     Comment: occ     Drug use: Never     Sexual activity: Not Currently     Birth control/protection: Post-menopausal   Lifestyle     Physical activity     Days per week: None     Minutes per session: None     Stress: None   Relationships     Social connections     Talks on phone: None     Gets together: None     Attends Spiritism service:  "None     Active member of club or organization: None     Attends meetings of clubs or organizations: None     Relationship status: None     Intimate partner violence     Fear of current or ex partner: None     Emotionally abused: None     Physically abused: None     Forced sexual activity: None   Other Topics Concern     Parent/sibling w/ CABG, MI or angioplasty before 65F 55M? Not Asked   Social History Narrative     None       History reviewed. No pertinent family history.    OBJECTIVE: /69 (BP Location: Left arm, Patient Position: Chair, Cuff Size: Adult Regular)   Pulse 71   Temp 98.3  F (36.8  C) (Tympanic)   Resp 18   Ht 1.626 m (5' 4\")   Wt 58.1 kg (128 lb)   LMP  (LMP Unknown)   Breastfeeding No   BMI 21.97 kg/m    No LMP recorded (lmp unknown). Patient is postmenopausal.  The pessary was removed without difficulty, cleaned in warm water and then replaced, after inspection of the vulva and vagina, which showed small area of granulation on left side towards apex.    ASSESSMENT:     ICD-10-CM    1. Pessary maintenance  Z46.89          PLAN: followup in 3 months for repeat pessary cleaning and reinspection of area of granulation    Viviana Simmons MD      "

## 2021-06-02 ENCOUNTER — RECORDS - HEALTHEAST (OUTPATIENT)
Dept: ADMINISTRATIVE | Facility: CLINIC | Age: 76
End: 2021-06-02

## 2021-06-28 ENCOUNTER — OFFICE VISIT (OUTPATIENT)
Dept: OBGYN | Facility: CLINIC | Age: 76
End: 2021-06-28
Payer: COMMERCIAL

## 2021-06-28 VITALS
DIASTOLIC BLOOD PRESSURE: 74 MMHG | TEMPERATURE: 99.4 F | WEIGHT: 127 LBS | SYSTOLIC BLOOD PRESSURE: 125 MMHG | BODY MASS INDEX: 21.68 KG/M2 | HEART RATE: 87 BPM | HEIGHT: 64 IN | RESPIRATION RATE: 18 BRPM

## 2021-06-28 DIAGNOSIS — Z46.89 PESSARY MAINTENANCE: Primary | ICD-10-CM

## 2021-06-28 PROCEDURE — 99213 OFFICE O/P EST LOW 20 MIN: CPT | Performed by: OBSTETRICS & GYNECOLOGY

## 2021-06-28 ASSESSMENT — MIFFLIN-ST. JEOR: SCORE: 1051.07

## 2021-06-28 NOTE — NURSING NOTE
"Initial /74 (BP Location: Left arm, Patient Position: Chair, Cuff Size: Adult Regular)   Pulse 87   Temp 99.4  F (37.4  C) (Tympanic)   Resp 18   Ht 1.626 m (5' 4\")   Wt 57.6 kg (127 lb)   LMP  (LMP Unknown)   Breastfeeding No   BMI 21.80 kg/m   Estimated body mass index is 21.8 kg/m  as calculated from the following:    Height as of this encounter: 1.626 m (5' 4\").    Weight as of this encounter: 57.6 kg (127 lb). .      "

## 2021-06-28 NOTE — PROGRESS NOTES
Paz is a 76 year old   female who presents for pessary recheck;  she currently has a ring with support pessary which she keeps in place continuously;  she reports no problems with her pessary, no discharge, no bleeding.. She is not using vaginal estrogen  a week.    All systems were reviewed and pertinent information in noted in subjective/HPI.    History reviewed. No pertinent past medical history.    History reviewed. No pertinent surgical history.    Current Outpatient Medications   Medication Sig Dispense Refill     amLODIPine (NORVASC) 5 MG tablet Take 7.5 mg by mouth       CALCIUM-MAGNESIUM-ZINC PO Take 1 tablet by mouth       Glucosamine HCl 1500 MG TABS Take 3,000 mg by mouth       Cranberry 600 MG TABS        Misc Natural Products (GLUCOSAMINE CHOND COMPLEX/MSM PO)        Multiple Vitamin (MULTI-VITAMINS) TABS Take 1 tablet by mouth       Omega-3 Fatty Acids (OMEGA-3 FISH OIL PO) Take 1 g by mouth daily         Social History     Socioeconomic History     Marital status:      Spouse name: None     Number of children: None     Years of education: None     Highest education level: None   Occupational History     None   Social Needs     Financial resource strain: None     Food insecurity     Worry: None     Inability: None     Transportation needs     Medical: None     Non-medical: None   Tobacco Use     Smoking status: Never Smoker     Smokeless tobacco: Never Used   Substance and Sexual Activity     Alcohol use: Yes     Comment: occ     Drug use: Never     Sexual activity: Not Currently     Birth control/protection: Post-menopausal   Lifestyle     Physical activity     Days per week: None     Minutes per session: None     Stress: None   Relationships     Social connections     Talks on phone: None     Gets together: None     Attends Adventism service: None     Active member of club or organization: None     Attends meetings of clubs or organizations: None     Relationship status: None      "Intimate partner violence     Fear of current or ex partner: None     Emotionally abused: None     Physically abused: None     Forced sexual activity: None   Other Topics Concern     Parent/sibling w/ CABG, MI or angioplasty before 65F 55M? Not Asked   Social History Narrative     None       History reviewed. No pertinent family history.    OBJECTIVE: /74 (BP Location: Left arm, Patient Position: Chair, Cuff Size: Adult Regular)   Pulse 87   Temp 99.4  F (37.4  C) (Tympanic)   Resp 18   Ht 1.626 m (5' 4\")   Wt 57.6 kg (127 lb)   LMP  (LMP Unknown)   Breastfeeding No   BMI 21.80 kg/m    No LMP recorded (lmp unknown). Patient is postmenopausal.  The pessary was removed without difficulty, cleaned in warm water and then replaced, after inspection of the vulva and vagina, which showed no erosions;.    ASSESSMENT:     ICD-10-CM    1. Pessary maintenance  Z46.89          PLAN: RETURN TO CLINIC 3 mons for pessary cleaning and replacement    Viviana Simmons MD      "

## 2021-09-23 ENCOUNTER — OFFICE VISIT (OUTPATIENT)
Dept: OBGYN | Facility: CLINIC | Age: 76
End: 2021-09-23
Payer: COMMERCIAL

## 2021-09-23 VITALS
SYSTOLIC BLOOD PRESSURE: 116 MMHG | WEIGHT: 126 LBS | TEMPERATURE: 97.5 F | DIASTOLIC BLOOD PRESSURE: 63 MMHG | HEART RATE: 71 BPM | BODY MASS INDEX: 21.63 KG/M2

## 2021-09-23 DIAGNOSIS — Z46.89 PESSARY MAINTENANCE: Primary | ICD-10-CM

## 2021-09-23 DIAGNOSIS — N89.8 GRANULATION TISSUE OF VAGINA: ICD-10-CM

## 2021-09-23 PROCEDURE — 99213 OFFICE O/P EST LOW 20 MIN: CPT | Mod: 25 | Performed by: OBSTETRICS & GYNECOLOGY

## 2021-09-23 PROCEDURE — 17250 CHEM CAUT OF GRANLTJ TISSUE: CPT | Performed by: OBSTETRICS & GYNECOLOGY

## 2021-09-23 NOTE — PROGRESS NOTES
Paz is a 76 year old   female who presents for pessary recheck;  she currently has a ring with support pessary which she keeps in place continuously;  she reports no problems with her pessary, no discharge, no bleeding.. She is not using vaginal estrogen  a week.    All systems were reviewed and pertinent information in noted in subjective/HPI.    No past medical history on file.    No past surgical history on file.    Current Outpatient Medications   Medication Sig Dispense Refill     amLODIPine (NORVASC) 5 MG tablet Take 7.5 mg by mouth       CALCIUM-MAGNESIUM-ZINC PO Take 1 tablet by mouth       Cranberry 600 MG TABS        Glucosamine HCl 1500 MG TABS Take 3,000 mg by mouth       Misc Natural Products (GLUCOSAMINE CHOND COMPLEX/MSM PO)        Multiple Vitamin (MULTI-VITAMINS) TABS Take 1 tablet by mouth       Omega-3 Fatty Acids (OMEGA-3 FISH OIL PO) Take 1 g by mouth daily         Social History     Socioeconomic History     Marital status:      Spouse name: None     Number of children: None     Years of education: None     Highest education level: None   Occupational History     None   Tobacco Use     Smoking status: Never Smoker     Smokeless tobacco: Never Used   Substance and Sexual Activity     Alcohol use: Yes     Comment: occ     Drug use: Never     Sexual activity: Not Currently     Birth control/protection: Post-menopausal   Other Topics Concern     Parent/sibling w/ CABG, MI or angioplasty before 65F 55M? Not Asked   Social History Narrative     None     Social Determinants of Health     Financial Resource Strain:      Difficulty of Paying Living Expenses:    Food Insecurity:      Worried About Running Out of Food in the Last Year:      Ran Out of Food in the Last Year:    Transportation Needs:      Lack of Transportation (Medical):      Lack of Transportation (Non-Medical):    Physical Activity:      Days of Exercise per Week:      Minutes of Exercise per Session:    Stress:       Feeling of Stress :    Social Connections:      Frequency of Communication with Friends and Family:      Frequency of Social Gatherings with Friends and Family:      Attends Amish Services:      Active Member of Clubs or Organizations:      Attends Club or Organization Meetings:      Marital Status:    Intimate Partner Violence:      Fear of Current or Ex-Partner:      Emotionally Abused:      Physically Abused:      Sexually Abused:        No family history on file.    OBJECTIVE: /63 (BP Location: Left arm, Patient Position: Chair, Cuff Size: Adult Regular)   Pulse 71   Temp 97.5  F (36.4  C) (Tympanic)   Wt 57.2 kg (126 lb)   LMP  (LMP Unknown)   Breastfeeding No   BMI 21.63 kg/m    No LMP recorded (lmp unknown). Patient is postmenopausal.  The pessary was removed without difficulty, cleaned in warm water and then replaced, after inspection of the vulva and vagina, which showed some granulation tissue on side side towards apex; this tissue was cauterized until blanched with multiple applications of silver nitrate.    ASSESSMENT:     ICD-10-CM    1. Pessary maintenance  Z46.89    2. Granulation tissue of vagina  N89.8 CHEM CAUTERY GRANULATION TISSUE         PLAN: f/u 3 months for pessary check and reassessment of vaginal wall granulation tissue    Viviana Simmons MD

## 2021-09-23 NOTE — NURSING NOTE
"Initial /63 (BP Location: Left arm, Patient Position: Chair, Cuff Size: Adult Regular)   Pulse 71   Temp 97.5  F (36.4  C) (Tympanic)   Wt 57.2 kg (126 lb)   LMP  (LMP Unknown)   Breastfeeding No   BMI 21.63 kg/m   Estimated body mass index is 21.63 kg/m  as calculated from the following:    Height as of 6/28/21: 1.626 m (5' 4\").    Weight as of this encounter: 57.2 kg (126 lb). .    Kary Ferrari MA    "

## 2021-10-20 NOTE — PROGRESS NOTES
Paz is a 74 year old   female who presents for pessary recheck;  she currently has a ring with support pessary which she keeps in place continuously;  she reports no problems with her pessary, no discharge, no bleeding.. She is not using vaginal estrogen  a week.    All systems were reviewed and pertinent information in noted in subjective/HPI.    History reviewed. No pertinent past medical history.    History reviewed. No pertinent surgical history.    Current Outpatient Medications   Medication Sig Dispense Refill     amLODIPine (NORVASC) 5 MG tablet Take 7.5 mg by mouth       CALCIUM-MAGNESIUM-ZINC PO Take 1 tablet by mouth       Glucosamine HCl 1500 MG TABS Take 3,000 mg by mouth       Multiple Vitamin (MULTI-VITAMINS) TABS Take 1 tablet by mouth       ALENDRONATE SODIUM PO Take by mouth once a week On Saturday       Omega-3 Fatty Acids (OMEGA-3 FISH OIL PO) Take 1 g by mouth daily       TAMOXIFEN CITRATE PO Take 20 mg by mouth daily          Social History     Socioeconomic History     Marital status:      Spouse name: None     Number of children: None     Years of education: None     Highest education level: None   Occupational History     None   Social Needs     Financial resource strain: None     Food insecurity:     Worry: None     Inability: None     Transportation needs:     Medical: None     Non-medical: None   Tobacco Use     Smoking status: Never Smoker     Smokeless tobacco: Never Used   Substance and Sexual Activity     Alcohol use: None     Drug use: None     Sexual activity: Not Currently     Birth control/protection: Post-menopausal   Lifestyle     Physical activity:     Days per week: None     Minutes per session: None     Stress: None   Relationships     Social connections:     Talks on phone: None     Gets together: None     Attends Anabaptist service: None     Active member of club or organization: None     Attends meetings of clubs or organizations: None     Relationship status:  "None     Intimate partner violence:     Fear of current or ex partner: None     Emotionally abused: None     Physically abused: None     Forced sexual activity: None   Other Topics Concern     Parent/sibling w/ CABG, MI or angioplasty before 65F 55M? Not Asked   Social History Narrative     None       History reviewed. No pertinent family history.    OBJECTIVE: /69 (BP Location: Left arm, Patient Position: Chair, Cuff Size: Adult Small)   Pulse 70   Temp 98.9  F (37.2  C) (Tympanic)   Resp 18   Ht 1.626 m (5' 4\")   Wt 61.7 kg (136 lb)   BMI 23.34 kg/m    No LMP recorded. Patient is postmenopausal.  The pessary was removed without difficulty, cleaned in warm water and then replaced, after inspection of the vulva and vagina, which showed no erosions; normal leukorrhea.    ASSESSMENT:     ICD-10-CM    1. Pessary maintenance Z46.89          PLAN: f/u 3 months for repeat pessary removal, cleaning and replacement    Viviana Simmons MD      " oral

## 2022-01-05 ENCOUNTER — OFFICE VISIT (OUTPATIENT)
Dept: OBGYN | Facility: CLINIC | Age: 77
End: 2022-01-05
Payer: COMMERCIAL

## 2022-01-05 VITALS
HEIGHT: 64 IN | RESPIRATION RATE: 20 BRPM | HEART RATE: 104 BPM | SYSTOLIC BLOOD PRESSURE: 142 MMHG | TEMPERATURE: 98.7 F | BODY MASS INDEX: 21.73 KG/M2 | DIASTOLIC BLOOD PRESSURE: 72 MMHG | WEIGHT: 127.3 LBS

## 2022-01-05 DIAGNOSIS — Z46.89 PESSARY MAINTENANCE: Primary | ICD-10-CM

## 2022-01-05 PROCEDURE — 99213 OFFICE O/P EST LOW 20 MIN: CPT | Performed by: OBSTETRICS & GYNECOLOGY

## 2022-01-05 ASSESSMENT — MIFFLIN-ST. JEOR: SCORE: 1047.43

## 2022-01-05 NOTE — PROGRESS NOTES
Paz is a 77 year old   female who presents for pessary recheck;  she currently has a ring with support pessary which she keeps in place continuously;  she reports no problems with her pessary, no discharge, no bleeding.. She is not using vaginal estrogen  a week.    All systems were reviewed and pertinent information in noted in subjective/HPI.    History reviewed. No pertinent past medical history.    Past Surgical History:   Procedure Laterality Date     cataract surgey Bilateral 2021       Current Outpatient Medications   Medication Sig Dispense Refill     amLODIPine (NORVASC) 5 MG tablet Take 7.5 mg by mouth       CALCIUM-MAGNESIUM-ZINC PO Take 1 tablet by mouth       Cranberry 600 MG TABS        Glucosamine HCl 1500 MG TABS Take 3,000 mg by mouth       Misc Natural Products (GLUCOSAMINE CHOND COMPLEX/MSM PO)        Multiple Vitamin (MULTI-VITAMINS) TABS Take 1 tablet by mouth (Patient not taking: Reported on 2022)       Omega-3 Fatty Acids (OMEGA-3 FISH OIL PO) Take 1 g by mouth daily (Patient not taking: Reported on 2022)         Social History     Socioeconomic History     Marital status:      Spouse name: None     Number of children: None     Years of education: None     Highest education level: None   Occupational History     None   Tobacco Use     Smoking status: Never Smoker     Smokeless tobacco: Never Used   Substance and Sexual Activity     Alcohol use: Yes     Comment: occ     Drug use: Never     Sexual activity: Not Currently     Birth control/protection: Post-menopausal   Other Topics Concern     Parent/sibling w/ CABG, MI or angioplasty before 65F 55M? Not Asked   Social History Narrative     None     Social Determinants of Health     Financial Resource Strain: Not on file   Food Insecurity: Not on file   Transportation Needs: Not on file   Physical Activity: Not on file   Stress: Not on file   Social Connections: Not on file   Intimate Partner Violence: Not on file  "  Housing Stability: Not on file       History reviewed. No pertinent family history.    OBJECTIVE: BP (!) 142/72 (BP Location: Left arm, Patient Position: Chair, Cuff Size: Adult Regular)   Pulse 104   Temp 98.7  F (37.1  C) (Tympanic)   Resp 20   Ht 1.626 m (5' 4\")   Wt 57.7 kg (127 lb 4.8 oz)   LMP  (LMP Unknown)   BMI 21.85 kg/m    No LMP recorded (lmp unknown). Patient is postmenopausal.  The pessary was removed without difficulty, cleaned in warm water and then replaced, after inspection of the vulva and vagina, which showed no erosions or granulation; normal  leukorrhea.    ASSESSMENT:     ICD-10-CM    1. Pessary maintenance  Z46.89          PLAN: RETURN TO CLINIC 3-4 months for pessary cleaning and examination    Viviana Simmons MD      "

## 2022-01-05 NOTE — NURSING NOTE
"Initial BP (!) 142/72 (BP Location: Left arm, Patient Position: Chair, Cuff Size: Adult Regular)   Pulse 104   Temp 98.7  F (37.1  C) (Tympanic)   Resp 20   Ht 1.626 m (5' 4\")   Wt 57.7 kg (127 lb 4.8 oz)   LMP  (LMP Unknown)   BMI 21.85 kg/m   Estimated body mass index is 21.85 kg/m  as calculated from the following:    Height as of this encounter: 1.626 m (5' 4\").    Weight as of this encounter: 57.7 kg (127 lb 4.8 oz). .    Whitney Newby LPN  "

## 2022-03-08 ENCOUNTER — APPOINTMENT (OUTPATIENT)
Dept: GENERAL RADIOLOGY | Facility: CLINIC | Age: 77
End: 2022-03-08
Attending: FAMILY MEDICINE
Payer: COMMERCIAL

## 2022-03-08 ENCOUNTER — HOSPITAL ENCOUNTER (EMERGENCY)
Facility: CLINIC | Age: 77
Discharge: HOME | End: 2022-03-08
Attending: FAMILY MEDICINE | Admitting: FAMILY MEDICINE
Payer: COMMERCIAL

## 2022-03-08 ENCOUNTER — APPOINTMENT (OUTPATIENT)
Dept: CT IMAGING | Facility: CLINIC | Age: 77
End: 2022-03-08
Attending: FAMILY MEDICINE
Payer: COMMERCIAL

## 2022-03-08 VITALS
SYSTOLIC BLOOD PRESSURE: 135 MMHG | OXYGEN SATURATION: 95 % | RESPIRATION RATE: 16 BRPM | BODY MASS INDEX: 23.6 KG/M2 | HEIGHT: 61 IN | DIASTOLIC BLOOD PRESSURE: 90 MMHG | HEART RATE: 80 BPM | WEIGHT: 125 LBS | TEMPERATURE: 98 F

## 2022-03-08 DIAGNOSIS — S20.211A CONTUSION OF RIGHT CHEST WALL, INITIAL ENCOUNTER: ICD-10-CM

## 2022-03-08 DIAGNOSIS — S42.211A CLOSED DISPLACED FRACTURE OF SURGICAL NECK OF RIGHT HUMERUS, UNSPECIFIED FRACTURE MORPHOLOGY, INITIAL ENCOUNTER: ICD-10-CM

## 2022-03-08 LAB
ANION GAP SERPL CALCULATED.3IONS-SCNC: 7 MMOL/L (ref 3–14)
BASOPHILS # BLD AUTO: 0 10E3/UL (ref 0–0.2)
BASOPHILS NFR BLD AUTO: 0 %
BUN SERPL-MCNC: 13 MG/DL (ref 7–30)
CALCIUM SERPL-MCNC: 9.2 MG/DL (ref 8.5–10.1)
CHLORIDE BLD-SCNC: 105 MMOL/L (ref 94–109)
CO2 SERPL-SCNC: 28 MMOL/L (ref 20–32)
CREAT SERPL-MCNC: 0.9 MG/DL (ref 0.52–1.04)
EOSINOPHIL # BLD AUTO: 0 10E3/UL (ref 0–0.7)
EOSINOPHIL NFR BLD AUTO: 0 %
ERYTHROCYTE [DISTWIDTH] IN BLOOD BY AUTOMATED COUNT: 13.9 % (ref 10–15)
GFR SERPL CREATININE-BSD FRML MDRD: 66 ML/MIN/1.73M2
GLUCOSE BLD-MCNC: 142 MG/DL (ref 70–99)
HCT VFR BLD AUTO: 33.1 % (ref 35–47)
HGB BLD-MCNC: 11.2 G/DL (ref 11.7–15.7)
IMM GRANULOCYTES # BLD: 0 10E3/UL
IMM GRANULOCYTES NFR BLD: 0 %
LYMPHOCYTES # BLD AUTO: 2.3 10E3/UL (ref 0.8–5.3)
LYMPHOCYTES NFR BLD AUTO: 25 %
MCH RBC QN AUTO: 31.7 PG (ref 26.5–33)
MCHC RBC AUTO-ENTMCNC: 33.8 G/DL (ref 31.5–36.5)
MCV RBC AUTO: 94 FL (ref 78–100)
MONOCYTES # BLD AUTO: 0.7 10E3/UL (ref 0–1.3)
MONOCYTES NFR BLD AUTO: 8 %
NEUTROPHILS # BLD AUTO: 6 10E3/UL (ref 1.6–8.3)
NEUTROPHILS NFR BLD AUTO: 67 %
NRBC # BLD AUTO: 0 10E3/UL
NRBC BLD AUTO-RTO: 0 /100
PLATELET # BLD AUTO: 183 10E3/UL (ref 150–450)
POTASSIUM BLD-SCNC: 3.7 MMOL/L (ref 3.4–5.3)
RBC # BLD AUTO: 3.53 10E6/UL (ref 3.8–5.2)
SODIUM SERPL-SCNC: 140 MMOL/L (ref 133–144)
WBC # BLD AUTO: 9.1 10E3/UL (ref 4–11)

## 2022-03-08 PROCEDURE — 24500 CLTX HUMRL SHFT FX W/O MNPJ: CPT | Mod: RT | Performed by: FAMILY MEDICINE

## 2022-03-08 PROCEDURE — 71260 CT THORAX DX C+: CPT

## 2022-03-08 PROCEDURE — 82310 ASSAY OF CALCIUM: CPT | Performed by: FAMILY MEDICINE

## 2022-03-08 PROCEDURE — 24500 CLTX HUMRL SHFT FX W/O MNPJ: CPT | Mod: 54 | Performed by: FAMILY MEDICINE

## 2022-03-08 PROCEDURE — 36415 COLL VENOUS BLD VENIPUNCTURE: CPT | Performed by: FAMILY MEDICINE

## 2022-03-08 PROCEDURE — 250N000009 HC RX 250: Performed by: FAMILY MEDICINE

## 2022-03-08 PROCEDURE — 73060 X-RAY EXAM OF HUMERUS: CPT | Mod: RT

## 2022-03-08 PROCEDURE — 99285 EMERGENCY DEPT VISIT HI MDM: CPT | Mod: 25 | Performed by: FAMILY MEDICINE

## 2022-03-08 PROCEDURE — 99284 EMERGENCY DEPT VISIT MOD MDM: CPT | Mod: 57 | Performed by: FAMILY MEDICINE

## 2022-03-08 PROCEDURE — 250N000011 HC RX IP 250 OP 636: Performed by: FAMILY MEDICINE

## 2022-03-08 PROCEDURE — 85025 COMPLETE CBC W/AUTO DIFF WBC: CPT | Performed by: FAMILY MEDICINE

## 2022-03-08 RX ORDER — IOPAMIDOL 755 MG/ML
61 INJECTION, SOLUTION INTRAVASCULAR ONCE
Status: COMPLETED | OUTPATIENT
Start: 2022-03-08 | End: 2022-03-08

## 2022-03-08 RX ORDER — OXYCODONE HYDROCHLORIDE 5 MG/1
2.5-5 TABLET ORAL EVERY 6 HOURS PRN
Qty: 10 TABLET | Refills: 0 | Status: SHIPPED | OUTPATIENT
Start: 2022-03-08 | End: 2022-05-19

## 2022-03-08 RX ADMIN — SODIUM CHLORIDE 55 ML: 9 INJECTION, SOLUTION INTRAVENOUS at 20:40

## 2022-03-08 RX ADMIN — IOPAMIDOL 61 ML: 755 INJECTION, SOLUTION INTRAVENOUS at 20:40

## 2022-03-09 NOTE — DISCHARGE INSTRUCTIONS
Take acetaminophen 650 mg 4 times per day if needed for pain.    You may add oxycodone 5 mg, 1/2 to 1 tablets up to every 6 hours if needed for pain.  Try to use this primarily only at night to help with sleep.    Do not use alcohol, operate machinery, drive, or climb on ladders, or perform other complex motor activity or make important decisions for 8 hours after taking oxycodone. Use docusate (100mg) 2 times a day to prevent constipation while on narcotics.  You should receive a call from the orthopedic  to schedule follow-up in the orthopedic clinic.  Wear the shoulder my immobilizer for protection and comfort; however you may remove it for bathing.

## 2022-03-09 NOTE — ED PROVIDER NOTES
History     Chief Complaint   Patient presents with     Arm Pain     Fall     HPI    Paz Workman is a 77 year old female who comes in with arm and chest pain after a fall.  This occurred yesterday.  She slipped on ice in her driveway and landed on her chest and right arm.  She now has pain in the chest and in the right proximal arm below the shoulder.  She is extensively bruised there.  She does not think she hit her head but is not certain.  She does not have a headache or neck pain.  She has no back pain.  She is walked without difficulty.  She has no other extremity pain.  She denies symptoms of recent illness.  She has not had any cough, cold, fever, flu since December.  She has a past history of breast cancer and has had bilateral mastectomies.    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    Past Surgical History:    Past Surgical History:   Procedure Laterality Date     cataract surgey Bilateral 07/01/2021       Family History:    No family history on file.    Social History:  Marital Status:   [2]  Social History     Tobacco Use     Smoking status: Never Smoker     Smokeless tobacco: Never Used   Substance Use Topics     Alcohol use: Yes     Comment: occ     Drug use: Never        Medications:    oxyCODONE (ROXICODONE) 5 MG tablet  amLODIPine (NORVASC) 5 MG tablet  CALCIUM-MAGNESIUM-ZINC PO  Cranberry 600 MG  "TABS  Glucosamine HCl 1500 MG TABS  Misc Natural Products (GLUCOSAMINE CHOND COMPLEX/MSM PO)  Multiple Vitamin (MULTI-VITAMINS) TABS  Omega-3 Fatty Acids (OMEGA-3 FISH OIL PO)      Review of Systems  All other systems are reviewed and are negative    Physical Exam   BP: (!) 148/74  Pulse: 90  Temp: 98.5  F (36.9  C)  Resp: 16  Height: 154.9 cm (5' 1\")  Weight: 56.7 kg (125 lb)  SpO2: 96 %      Physical Exam    Nursing note and vitals were reviewed.  Constitutional: Awake and alert, adequately nourished and developed appearing 77-year-old in mild discomfort, who does not appear acutely ill, and who answers questions appropriately and cooperates with examination.  HEENT: Atraumatic and normocephalic.  No schwartz sign.  No raccoon eyes.  No CSF otorrhea or rhinorrhea.  PERRL EOMI.   Neck: Freely mobile.  No pain with range of motion of the neck.  No tenderness over the cervical spine.  Cardiovascular: Cardiac examination reveals normal heart rate and regular rhythm without murmur.  Pulmonary/Chest: Breathing is unlabored.  Breath sounds are clear and equal bilaterally.  There no retractions, tachypnea, rales, wheezes, or rhonchi.  Chest wall is tender to palpation in the right anterior axillary line and right anterior chest without crepitus, subcutaneous emphysema, ecchymosis, swelling.  Abdomen: Soft, nontender, no HSM or masses rebound or guarding.  Musculoskeletal: There is ecchymosis of the right arm over the biceps and lateral arm over the length of it from the shoulder to the elbow.  Elbow motion is normal and without discomfort.  Clavicle is nontender.  Scapula is nontender.  There is marked tenderness over the proximal humerus without visible deformity.  There is no evidence of a shoulder dislocation and no sulcus sign.  There is pain with it attempts at range of motion at the shoulder.  The hand is warm and well-perfused.  Neurological: Alert, oriented, thought content logical, coherent   Skin: Warm, dry, no " liya.  Psychiatric: Affect broad and appropriate.    ED Course                 Procedures              Critical Care time:  none               Results for orders placed or performed during the hospital encounter of 03/08/22 (from the past 24 hour(s))   CBC with platelets differential    Narrative    The following orders were created for panel order CBC with platelets differential.  Procedure                               Abnormality         Status                     ---------                               -----------         ------                     CBC with platelets and d...[[id number]]  Abnormal            Final result                 Please view results for these tests on the individual orders.   Basic metabolic panel   Result Value Ref Range    Sodium 140 133 - 144 mmol/L    Potassium 3.7 3.4 - 5.3 mmol/L    Chloride 105 94 - 109 mmol/L    Carbon Dioxide (CO2) 28 20 - 32 mmol/L    Anion Gap 7 3 - 14 mmol/L    Urea Nitrogen 13 7 - 30 mg/dL    Creatinine 0.90 0.52 - 1.04 mg/dL    Calcium 9.2 8.5 - 10.1 mg/dL    Glucose 142 (H) 70 - 99 mg/dL    GFR Estimate 66 >60 mL/min/1.73m2   CBC with platelets and differential   Result Value Ref Range    WBC Count 9.1 4.0 - 11.0 10e3/uL    RBC Count 3.53 (L) 3.80 - 5.20 10e6/uL    Hemoglobin 11.2 (L) 11.7 - 15.7 g/dL    Hematocrit 33.1 (L) 35.0 - 47.0 %    MCV 94 78 - 100 fL    MCH 31.7 26.5 - 33.0 pg    MCHC 33.8 31.5 - 36.5 g/dL    RDW 13.9 10.0 - 15.0 %    Platelet Count 183 150 - 450 10e3/uL    % Neutrophils 67 %    % Lymphocytes 25 %    % Monocytes 8 %    % Eosinophils 0 %    % Basophils 0 %    % Immature Granulocytes 0 %    NRBCs per 100 WBC 0 <1 /100    Absolute Neutrophils 6.0 1.6 - 8.3 10e3/uL    Absolute Lymphocytes 2.3 0.8 - 5.3 10e3/uL    Absolute Monocytes 0.7 0.0 - 1.3 10e3/uL    Absolute Eosinophils 0.0 0.0 - 0.7 10e3/uL    Absolute Basophils 0.0 0.0 - 0.2 10e3/uL    Absolute Immature Granulocytes 0.0 <=0.4 10e3/uL    Absolute NRBCs 0.0 10e3/uL   CT Chest w  Contrast    Narrative    EXAM: CT CHEST W CONTRAST  LOCATION: [facility]  DATE/TIME: 3/8/2022 8:39 PM    INDICATION: Fall; chest pain  COMPARISON: None.  TECHNIQUE: CT chest with IV contrast. Multiplanar reformats were obtained. Dose reduction techniques were used.    CONTRAST: 61 ml Isovue 370    FINDINGS:   LUNGS AND PLEURA: Lungs are clear. No pleural effusion.    MEDIASTINUM/AXILLAE: No lymphadenopathy. No thoracic aneurysm.    CORONARY ARTERY CALCIFICATION: Mild.    UPPER ABDOMEN: Benign simple cyst lower pole of the left kidney and no follow-up is needed. Minute esophageal hiatal hernia. Cholelithiasis with the gallbladder otherwise normal in appearance.    MUSCULOSKELETAL: Acute comminuted partially included fracture seen involving the proximal aspect of the right humerus. No other acute musculoskeletal abnormalities are seen.      Impression    IMPRESSION:   1.  Acute fracture upper right humerus. No other acute abnormalities seen.   Humerus XR, G/E 2 views, right    Narrative    EXAM: XR HUMERUS RIGHT G/E 2 VIEWS  LOCATION: [facility]  DATE/TIME: 3/8/2022 8:47 PM    INDICATION: fall; arm pain  COMPARISON: None.      Impression    IMPRESSION: Comminuted displaced fracture of the surgical neck and greater tuberosity of the proximal humerus. At the surgical neck fracture of the main distal fragment is displaced anteromedially by a few millimeters. Separate greater tuberosity   fragment is displaced laterally and superiorly by as much as 1 cm..       Medications   iopamidol (ISOVUE-370) solution 61 mL (61 mLs Intravenous Given 3/8/22 2040)   sodium chloride 0.9 % bag 500mL for CT scan flush use (55 mLs Intravenous Given 3/8/22 2040)       Assessments & Plan (with Medical Decision Making)     77-year-old female presented after a fall yesterday as described above injuring her right shoulder and her chest wall.  X-rays showed a comminuted fracture of the  humeral head.  It is minimally displaced except for the fragment of the greater tuberosity.  CT of the chest did not show evidence of rib fractures, lung contusion, or other pulmonary injury.  Symptomatic care was outlined.  She is not really willing to take ibuprofen because she believes it makes her confused.  I think that it is more likely the codeine that did this in the past.  She is not getting adequate pain relief with plain Tylenol.  I have offered her oxycodone to use 1/2-1 5 mg tablet as needed.  We have added her with a shoulder immobilizer for protection and comfort.  I placed a referral to the orthopedic  for follow-up.  She and her  expressed understanding and their questions were answered.    I have reviewed the nursing notes.    I have reviewed the findings, diagnosis, plan and need for follow up with the patient.       New Prescriptions    OXYCODONE (ROXICODONE) 5 MG TABLET    Take 0.5-1 tablets (2.5-5 mg) by mouth every 6 hours as needed for severe pain       Final diagnoses:   Closed displaced fracture of surgical neck of right humerus, unspecified fracture morphology, initial encounter   Contusion of right chest wall, initial encounter       3/8/2022   [facility] EMERGENCY DEPT     Leeroy Cardenas MD  03/08/22 [id number]

## 2022-05-19 ENCOUNTER — OFFICE VISIT (OUTPATIENT)
Dept: OBGYN | Facility: CLINIC | Age: 77
End: 2022-05-19
Payer: COMMERCIAL

## 2022-05-19 VITALS
RESPIRATION RATE: 18 BRPM | TEMPERATURE: 97.8 F | SYSTOLIC BLOOD PRESSURE: 125 MMHG | HEART RATE: 78 BPM | BODY MASS INDEX: 21.51 KG/M2 | HEIGHT: 64 IN | DIASTOLIC BLOOD PRESSURE: 78 MMHG | WEIGHT: 126 LBS

## 2022-05-19 DIAGNOSIS — Z46.89 PESSARY MAINTENANCE: Primary | ICD-10-CM

## 2022-05-19 PROCEDURE — 99212 OFFICE O/P EST SF 10 MIN: CPT | Performed by: OBSTETRICS & GYNECOLOGY

## 2022-05-19 NOTE — PROGRESS NOTES
"Paz is a 77 year old   female who presents for pessary recheck;  she currently has a ring with support pessary which she keeps in place continuousl6;  she reports no problems with her pessary, no discharge, no bleeding.. She is not using vaginal estrogen  a week.    All systems were reviewed and pertinent information in noted in subjective/HPI.    History reviewed. No pertinent past medical history.    Past Surgical History:   Procedure Laterality Date     cataract surgey Bilateral 2021       Current Outpatient Medications   Medication Sig Dispense Refill     amLODIPine (NORVASC) 5 MG tablet Take 7.5 mg by mouth       CALCIUM-MAGNESIUM-ZINC PO Take 1 tablet by mouth       Cranberry 600 MG TABS        Glucosamine HCl 1500 MG TABS Take 3,000 mg by mouth       Misc Natural Products (GLUCOSAMINE CHOND COMPLEX/MSM PO)        Multiple Vitamin (MULTI-VITAMINS) TABS Take 1 tablet by mouth       Omega-3 Fatty Acids (OMEGA-3 FISH OIL PO) Take 1 g by mouth daily         Social History     Socioeconomic History     Marital status:      Spouse name: None     Number of children: None     Years of education: None     Highest education level: None   Tobacco Use     Smoking status: Never Smoker     Smokeless tobacco: Never Used   Vaping Use     Vaping Use: Never used   Substance and Sexual Activity     Alcohol use: Yes     Comment: occ     Drug use: Never     Sexual activity: Not Currently     Birth control/protection: Post-menopausal       History reviewed. No pertinent family history.    OBJECTIVE: /78 (BP Location: Left arm, Patient Position: Chair, Cuff Size: Adult Regular)   Pulse 78   Temp 97.8  F (36.6  C) (Tympanic)   Resp 18   Ht 1.626 m (5' 4\")   Wt 57.2 kg (126 lb)   LMP  (LMP Unknown)   Breastfeeding No   BMI 21.63 kg/m    No LMP recorded (lmp unknown). Patient is postmenopausal.  The pessary was removed without difficulty, cleaned in warm water and then replaced, after inspection of " the vulva and vagina, which showed no erosions or excess leukorrhea.    ASSESSMENT:     ICD-10-CM    1. Pessary maintenance  Z46.89          PLAN: RETURN TO CLINIC 3-4 months for pessary cleaning and examination    Viviana Simmons MD

## 2022-05-19 NOTE — NURSING NOTE
"Initial /78 (BP Location: Left arm, Patient Position: Chair, Cuff Size: Adult Regular)   Pulse 78   Temp 97.8  F (36.6  C) (Tympanic)   Resp 18   Ht 1.626 m (5' 4\")   Wt 57.2 kg (126 lb)   LMP  (LMP Unknown)   Breastfeeding No   BMI 21.63 kg/m   Estimated body mass index is 21.63 kg/m  as calculated from the following:    Height as of this encounter: 1.626 m (5' 4\").    Weight as of this encounter: 57.2 kg (126 lb). .      "
Statement Selected

## 2022-06-28 ENCOUNTER — HOSPITAL ENCOUNTER (OUTPATIENT)
Dept: MRI IMAGING | Facility: CLINIC | Age: 77
Discharge: HOME | End: 2022-06-28
Attending: ORTHOPAEDIC SURGERY | Admitting: ORTHOPAEDIC SURGERY
Payer: COMMERCIAL

## 2022-06-28 DIAGNOSIS — M25.511 PAIN IN JOINT OF RIGHT SHOULDER: ICD-10-CM

## 2022-06-28 PROCEDURE — 73221 MRI JOINT UPR EXTREM W/O DYE: CPT | Mod: 26 | Performed by: RADIOLOGY

## 2022-06-28 PROCEDURE — 73221 MRI JOINT UPR EXTREM W/O DYE: CPT | Mod: RT

## 2022-08-01 ENCOUNTER — TELEPHONE (OUTPATIENT)
Dept: OBGYN | Facility: CLINIC | Age: 77
End: 2022-08-01

## 2022-08-01 NOTE — TELEPHONE ENCOUNTER
Paz only wants a female OB Doctor. Please call to advise her. She would like to see you before you retire. She will be back In town on July 18th.  Thank you  Rosa Brown on 8/1/2022 at 5:33 PM

## 2022-08-24 ENCOUNTER — PREP FOR PROCEDURE (OUTPATIENT)
Dept: OBGYN | Facility: CLINIC | Age: 77
End: 2022-08-24

## 2022-08-25 ENCOUNTER — OFFICE VISIT (OUTPATIENT)
Dept: OBGYN | Facility: CLINIC | Age: 77
End: 2022-08-25

## 2022-08-25 VITALS
BODY MASS INDEX: 23.71 KG/M2 | WEIGHT: 125.6 LBS | RESPIRATION RATE: 16 BRPM | HEART RATE: 86 BPM | HEIGHT: 61 IN | SYSTOLIC BLOOD PRESSURE: 123 MMHG | TEMPERATURE: 97 F | DIASTOLIC BLOOD PRESSURE: 78 MMHG

## 2022-08-25 DIAGNOSIS — N95.2 VAGINAL ATROPHY: ICD-10-CM

## 2022-08-25 DIAGNOSIS — Z46.89 PESSARY MAINTENANCE: Primary | ICD-10-CM

## 2022-08-25 PROCEDURE — 99213 OFFICE O/P EST LOW 20 MIN: CPT | Performed by: OBSTETRICS & GYNECOLOGY

## 2022-08-25 RX ORDER — ESTRADIOL 10 UG/1
10 INSERT VAGINAL DAILY
Qty: 18 TABLET | Refills: 1 | Status: SHIPPED | OUTPATIENT
Start: 2022-08-25 | End: 2022-09-08

## 2022-08-25 NOTE — PROGRESS NOTES
"Pessary follow-up visit.    Paz Workman is not complaining of any problems with the pessary. She is here for it to be removed, cleaned and reinserted.     /78 (BP Location: Right arm, Patient Position: Chair, Cuff Size: Adult Regular)   Pulse 86   Temp 97  F (36.1  C) (Tympanic)   Resp 16   Ht 1.549 m (5' 1\")   Wt 57 kg (125 lb 9.6 oz)   LMP  (LMP Unknown)   Breastfeeding No   BMI 23.73 kg/m       EXAM:    General Appearance: Pleasant, alert, appropriate appearance for age. No acute distress  Head Exam: Normocephalic, without obvious abnormality.  Genitourinary Exam Female:   External genitalia: atrophic vulva  Urinary system: urethral meatus normal  Vagina: mucosa atrophic and pale, no excoriations or ulcerations noted.    The size 3 ring with support pessary was removed, cleaned and reinserted without issue.    IMP: Pelvic prolapse - doing well with pessary. No ulceration, but she does have a paler, atrophic area at the posterior apex of the vagina.  We discussed vaginal estrogen. She thinks she was given a prescription for this in the past, but couldn't afford it.  I gave her a written prescription for Vagifem and she will check on cost with her pharmacy.  Whether she uses it or not, I recommended early recheck to make sure she doesn't develop an ulcer there.    Plan: Follow up in 2 months.    Ellie Mendoza MD on 8/25/2022 at 9:52 AM         "

## 2022-08-25 NOTE — NURSING NOTE
"Initial /78 (BP Location: Right arm, Patient Position: Chair, Cuff Size: Adult Regular)   Pulse 86   Temp 97  F (36.1  C) (Tympanic)   Resp 16   Ht 1.549 m (5' 1\")   Wt 57 kg (125 lb 9.6 oz)   LMP  (LMP Unknown)   Breastfeeding No   BMI 23.73 kg/m   Estimated body mass index is 23.73 kg/m  as calculated from the following:    Height as of this encounter: 1.549 m (5' 1\").    Weight as of this encounter: 57 kg (125 lb 9.6 oz). .    Daisha Gregorio, [facility]    "

## 2022-08-25 NOTE — PATIENT INSTRUCTIONS
A prescription for Vagifem was printed for you.    Place one tablet in the vagina, before bed, every night for two weeks.  After two weeks, you can continue using Vagifem, but use it twice a week.    Please recheck in 2 months.

## 2022-10-24 ENCOUNTER — OFFICE VISIT (OUTPATIENT)
Dept: OBGYN | Facility: CLINIC | Age: 77
End: 2022-10-24
Payer: COMMERCIAL

## 2022-10-24 VITALS
SYSTOLIC BLOOD PRESSURE: 128 MMHG | HEART RATE: 84 BPM | HEIGHT: 61 IN | BODY MASS INDEX: 24.09 KG/M2 | DIASTOLIC BLOOD PRESSURE: 69 MMHG | TEMPERATURE: 97.8 F | RESPIRATION RATE: 16 BRPM | WEIGHT: 127.6 LBS

## 2022-10-24 DIAGNOSIS — N81.10 VAGINAL WALL PROLAPSE: Primary | ICD-10-CM

## 2022-10-24 PROCEDURE — 99212 OFFICE O/P EST SF 10 MIN: CPT | Performed by: OBSTETRICS & GYNECOLOGY

## 2022-10-24 NOTE — PROGRESS NOTES
"Pessary follow-up visit.    Paz Workman is not complaining of any problems with the pessary. She is here for it to be removed, cleaned and reinserted. She feels she is doing well with the pessary.  She notes a little area of skin at the opening and wonders what it is.  She used vaginal estrogen for 10 days, but she started getting pain in the right breast where her cancer was so she stopped it and the pain resolved.  No bleeding.  When she wakes up, she has 3 bowel movements and she voids every 3 hours during the day, but then she feels better.    /69 (BP Location: Left arm, Patient Position: Chair, Cuff Size: Adult Regular)   Pulse 84   Temp 97.8  F (36.6  C) (Tympanic)   Resp 16   Ht 1.549 m (5' 1\")   Wt 57.9 kg (127 lb 9.6 oz)   LMP  (LMP Unknown)   BMI 24.11 kg/m       EXAM:    General Appearance: Pleasant, alert, appropriate appearance for age. No acute distress  Head Exam: Normocephalic, without obvious abnormality.  Genitourinary Exam Female:   External genitalia: atrophic vulva  Urinary system: urethral meatus normal  Vagina: mucosa atrophic and pale, no excoriations or ulcerations noted. No ulceration, but she does have a paler, atrophic area at the posterior apex of the vagina.      The size 3 ring pessary was removed, cleaned and reinserted without issue.    IMP: Pelvic prolapse - doing well with pessary.  No current erosion, but she does have a pale area at the apex of the vagina. She used estrogen for 10 days and had breast pain, so she stopped it. I have concerns that a smaller pessary will fall out.  Will continue observation with the current pessary.    Plan: Follow up in 3 months.    Ellie Mendoza MD on 10/24/2022 at 9:43 AM         "

## 2022-10-24 NOTE — NURSING NOTE
"Initial /69 (BP Location: Left arm, Patient Position: Chair, Cuff Size: Adult Regular)   Pulse 84   Temp 97.8  F (36.6  C) (Tympanic)   Resp 16   Ht 1.549 m (5' 1\")   Wt 57.9 kg (127 lb 9.6 oz)   LMP  (LMP Unknown)   BMI 24.11 kg/m   Estimated body mass index is 24.11 kg/m  as calculated from the following:    Height as of this encounter: 1.549 m (5' 1\").    Weight as of this encounter: 57.9 kg (127 lb 9.6 oz). .    Daisha Gregorio, [facility]    "

## 2022-12-05 ENCOUNTER — TELEPHONE (OUTPATIENT)
Dept: OBGYN | Facility: CLINIC | Age: 77
End: 2022-12-05

## 2022-12-05 NOTE — TELEPHONE ENCOUNTER
Would you want to see this on an on call day?  Please advise on appointment spot.    Thank you.    Shruti Sanchez   [facility]  [facility]

## 2022-12-05 NOTE — TELEPHONE ENCOUNTER
Patient states that uterus is now falling out when she walks and she is very uncomfortable. She would like to see Dr. Martinez. There are no available appointments for a few weeks, so wanting to see if/when she could get in before that.

## 2022-12-06 ENCOUNTER — TELEPHONE (OUTPATIENT)
Dept: OBGYN | Facility: CLINIC | Age: 77
End: 2022-12-06

## 2022-12-06 ENCOUNTER — PREP FOR PROCEDURE (OUTPATIENT)
Dept: OBGYN | Facility: CLINIC | Age: 77
End: 2022-12-06

## 2022-12-06 ENCOUNTER — OFFICE VISIT (OUTPATIENT)
Dept: OBGYN | Facility: CLINIC | Age: 77
End: 2022-12-06
Payer: COMMERCIAL

## 2022-12-06 ENCOUNTER — HOSPITAL ENCOUNTER (OUTPATIENT)
Facility: CLINIC | Age: 77
End: 2022-12-06
Attending: OBSTETRICS & GYNECOLOGY | Admitting: OBSTETRICS & GYNECOLOGY
Payer: COMMERCIAL

## 2022-12-06 VITALS
DIASTOLIC BLOOD PRESSURE: 67 MMHG | HEIGHT: 64 IN | SYSTOLIC BLOOD PRESSURE: 122 MMHG | TEMPERATURE: 97.6 F | WEIGHT: 124.8 LBS | HEART RATE: 83 BPM | RESPIRATION RATE: 18 BRPM | BODY MASS INDEX: 21.31 KG/M2

## 2022-12-06 DIAGNOSIS — R10.2 PELVIC PAIN IN FEMALE: Primary | ICD-10-CM

## 2022-12-06 DIAGNOSIS — N81.4 UTEROVAGINAL PROLAPSE: Primary | ICD-10-CM

## 2022-12-06 PROCEDURE — 99213 OFFICE O/P EST LOW 20 MIN: CPT | Performed by: OBSTETRICS & GYNECOLOGY

## 2022-12-06 RX ORDER — ACETAMINOPHEN 325 MG/1
975 TABLET ORAL ONCE
Status: CANCELLED | OUTPATIENT
Start: 2022-12-06 | End: 2022-12-06

## 2022-12-06 RX ORDER — CEFAZOLIN SODIUM 2 G/100ML
2 INJECTION, SOLUTION INTRAVENOUS SEE ADMIN INSTRUCTIONS
Status: CANCELLED | OUTPATIENT
Start: 2022-12-06

## 2022-12-06 RX ORDER — CEFAZOLIN SODIUM 2 G/100ML
2 INJECTION, SOLUTION INTRAVENOUS
Status: CANCELLED | OUTPATIENT
Start: 2022-12-06

## 2022-12-06 NOTE — NURSING NOTE
"Initial /67 (BP Location: Right arm, Patient Position: Chair, Cuff Size: Adult Regular)   Pulse 83   Temp 97.6  F (36.4  C) (Tympanic)   Resp 18   Ht 1.626 m (5' 4\")   Wt 56.6 kg (124 lb 12.8 oz)   LMP  (LMP Unknown)   BMI 21.42 kg/m   Estimated body mass index is 21.42 kg/m  as calculated from the following:    Height as of this encounter: 1.626 m (5' 4\").    Weight as of this encounter: 56.6 kg (124 lb 12.8 oz). .    Daisha Gregorio, [facility]    "

## 2022-12-06 NOTE — PROGRESS NOTES
Chief Complaint   Patient presents with     Consult     prolapse         HPI:  Paz Workman, 77 year old,  presents with complaints of her uterus falling out past her pessary.  She notices a walnut size spot in the vagina when she looks.  She complains that it never looked like that before.  She also complains that she isn't able to urinate.  She used to void every 4-5 hours, now she goes every 5-6 hours and doesn't feel like she gets much out.  She wears a mini pad daily for a little bit of urinary leakage and is worried about developing incontinence.        History reviewed. No pertinent past medical history.  Past Surgical History:   Procedure Laterality Date     cataract surgey Bilateral 2021     Social History     Socioeconomic History     Marital status:      Spouse name: Not on file     Number of children: Not on file     Years of education: Not on file     Highest education level: Not on file   Occupational History     Not on file   Tobacco Use     Smoking status: Never     Smokeless tobacco: Never   Vaping Use     Vaping Use: Never used   Substance and Sexual Activity     Alcohol use: Yes     Comment: occ     Drug use: Never     Sexual activity: Not Currently     Birth control/protection: Post-menopausal   Other Topics Concern     Parent/sibling w/ CABG, MI or angioplasty before 65F 55M? Not Asked   Social History Narrative     Not on file     Social Determinants of Health     Financial Resource Strain: Not on file   Food Insecurity: Not on file   Transportation Needs: Not on file   Physical Activity: Not on file   Stress: Not on file   Social Connections: Not on file   Intimate Partner Violence: Not on file   Housing Stability: Not on file     History reviewed. No pertinent family history.     Current Outpatient Medications   Medication Sig Dispense Refill     amLODIPine (NORVASC) 5 MG tablet Take 7.5 mg by mouth       CALCIUM-MAGNESIUM-ZINC PO Take 1 tablet by mouth        "Glucosamine HCl 1500 MG TABS Take 3,000 mg by mouth       Multiple Vitamin (MULTI-VITAMINS) TABS Take 1 tablet by mouth       Multiple Vitamins-Minerals (EYE HEALTH PO)        Cranberry 600 MG TABS  (Patient not taking: Reported on 12/6/2022)          Allergies:     Allergies   Allergen Reactions     Atorvastatin Muscle Pain (Myalgia)     Ibuprofen Swelling     Lisinopril Other (See Comments)        ROS:  See HPI      Physical Exam:  /67 (BP Location: Right arm, Patient Position: Chair, Cuff Size: Adult Regular)   Pulse 83   Temp 97.6  F (36.4  C) (Tympanic)   Resp 18   Ht 1.626 m (5' 4\")   Wt 56.6 kg (124 lb 12.8 oz)   LMP  (LMP Unknown)   BMI 21.42 kg/m         General Appearance: Pleasant, alert, appropriate appearance for age. No acute distress  Head Exam: Normocephalic, without obvious abnormality.  Genitourinary Exam Female:   External genitalia: atrophic vulva  Urinary system: urethral prolapse noted  Vagina: mucosa atrophic and pale, slight, superficial erythema at the posterior apex of the vagina.     The size 3 ring pessary was removed and cleaned.  Her bladder was scanned and she had 377 ml of urine in her bladder.  She was allowed to void without the pessary and she had 370 ml postvoid.  The pessary was replaced and she was allowed to void again and had 300 ml of urine in the bladder.            Assessment/Plan:  Uterovaginal prolapse.  She has multiple concerns with the pessary.  We discussed the urethral prolapse.  She has a history of breast cancer, and used a very low dose topical estrogen, but stopped it due to breast tenderness.  We discussed that urethral prolapse does not require treatment.  I did review her exam with a mirror with her, and the \"walnut\" sized prolapse she complains of is the urethra.    She has the start of an erosion at the apex of the vagina.  She does not want vaginal estrogen.  She would like to discuss surgical options, but she has a trip planned over the " holidays, so she would like to make plans in January.  We discussed hysterectomy with vault suspension, LeFort colpocleisis or sacrocolpexy.  She has not been sexually active for over 20 years and has no plans to be sexually active.  She is interested in the least recovery or risk of infection.  We discussed getting a pelvic ultrasound to evaluate the uterus and ovaries and if they are normal, we can proceed with a modified Le[place] partial colpocleisis.  We discussed risks and benefits including the inability to reverse it, have intercourse or do pelvic exams.  Discussed the limitations with diagnosing uterine or ovarian cancer with the vagina closed.  She states she is prone to infections and would like the least recovery and least risk of infection. Will plan surgery after an ultrasound.      Ellie Mendoza MD on 12/6/2022 at 10:34 AM

## 2022-12-06 NOTE — TELEPHONE ENCOUNTER
"[id number]  Pazgee Juarez Workman    You are now scheduled for surgery at [facility].  Below are the details for your surgery.  Please read the \"Preparing for Your Surgery\" instructions and let us know if you have any questions.    Type of surgery: [place] partial colpocleisis, perineoplasty    Surgeon:  Ellie Mendoza MD  Location of surgery: [facility] OR    Date of surgery: 1-5-23    Time: 9:00am   Arrival Time: 8:00am    Time can change, to be confirmed a couple of days prior by pre-op surgery nurse.    Pre-Op Appt Date: Patient to schedule with a PCP or Family Practice Provider within 30 days to the surgery.  Post-Op Appt Date: To be determined by provider       Packet sent out: Yes  Pre-cert/Authorization completed:  TBD by Financial Securing Office.   MA Sterilization/Hysterectomy Acknowledgment Consent signed: Not Applicable    [facility]  [phone]    Fax: [phone]  Same Day Surgery [phone]  Fax: [phone]  [facility] [phone]    "

## 2022-12-13 ENCOUNTER — TELEPHONE (OUTPATIENT)
Dept: OBGYN | Facility: CLINIC | Age: 77
End: 2022-12-13

## 2022-12-13 NOTE — TELEPHONE ENCOUNTER
Reason for Call:  Other call back    Detailed comments: Pt want to do a laparoscopic hysterectomy instead of    [place] partial colpocleisis, perineoplasty that she is scheduled for on 1/5/23 she is wondering if procedure can be changed. Pt also wondering if procedure is changed if she still needs to do the Ultrasound that is scheduled.        Phone Number Patient can be reached at: Cell number on file:    Telephone Information:   Mobile [phone]       Best Time:       Can we leave a detailed message on this number? YES    Call taken on 12/13/2022 at 8:50 AM by Hannah Gagnon MA

## 2022-12-14 NOTE — TELEPHONE ENCOUNTER
Pt informed of response below.  Will wait to hear back from Ellie Mendoza MD regarding pt's request to change surgery.    Please advise.    Thanks-    -Aparna Burgos  [facility]

## 2022-12-14 NOTE — TELEPHONE ENCOUNTER
Dr. Martinez is out of the office this week. I would have her still get the ultrasound, she needs this prior to either surgery option. She can review this when she gets back to see if a hysterectomy is an option or what the best surgical plan would be.    Lexy Thomas, DO

## 2022-12-20 ENCOUNTER — OFFICE VISIT (OUTPATIENT)
Dept: OBGYN | Facility: CLINIC | Age: 77
End: 2022-12-20
Payer: COMMERCIAL

## 2022-12-20 ENCOUNTER — HOSPITAL ENCOUNTER (OUTPATIENT)
Dept: ULTRASOUND IMAGING | Facility: CLINIC | Age: 77
Discharge: HOME | End: 2022-12-20
Attending: OBSTETRICS & GYNECOLOGY | Admitting: OBSTETRICS & GYNECOLOGY
Payer: COMMERCIAL

## 2022-12-20 VITALS
TEMPERATURE: 97.6 F | BODY MASS INDEX: 21.34 KG/M2 | RESPIRATION RATE: 18 BRPM | WEIGHT: 125 LBS | HEART RATE: 75 BPM | DIASTOLIC BLOOD PRESSURE: 83 MMHG | SYSTOLIC BLOOD PRESSURE: 135 MMHG | HEIGHT: 64 IN

## 2022-12-20 DIAGNOSIS — R10.2 PELVIC PAIN IN FEMALE: ICD-10-CM

## 2022-12-20 DIAGNOSIS — N81.10 VAGINAL WALL PROLAPSE: Primary | ICD-10-CM

## 2022-12-20 PROCEDURE — 76830 TRANSVAGINAL US NON-OB: CPT

## 2022-12-20 PROCEDURE — 99213 OFFICE O/P EST LOW 20 MIN: CPT | Performed by: OBSTETRICS & GYNECOLOGY

## 2022-12-20 NOTE — RESULT ENCOUNTER NOTE
Pt notified of below.    Patient would like a call back today from Dr. Martinez to further discuss.    Patient unable to get back in to see Dr. Martinez un 1/3/2023. Patient has family flying in from out of town to help her with her surgery. Patient now wondering if they should come and when. Patient also has appointment for post op tomorrow. She does not want to go to this if unnecessary as surgery will not be happening for awhile. Patient has many questions writer unable to answer. Will defer to Dr. Martinez. Patient home today and waiting for a call back if possible to speak to Dr. Martinez.    Shruti Sanchez   [facility]  RN

## 2022-12-20 NOTE — PROGRESS NOTES
"Pessary follow-up visit.    Paz Workman is here for pessary removal and replacement for ultrasound.  She checked with her insurance and they will cover a laparoscopic hysterectomy, so she thinks she would like to switch to this.  She had a laparoscopic tubal in her 30s and had minimal recovery, so she wants laparoscopic surgery again. She doesn't want to go through 8 weeks of recovery.    /83 (BP Location: Left arm, Patient Position: Chair, Cuff Size: Adult Regular)   Pulse 75   Temp 97.6  F (36.4  C) (Tympanic)   Resp 18   Ht 1.626 m (5' 4\")   Wt 56.7 kg (125 lb)   LMP  (LMP Unknown)   BMI 21.46 kg/m       EXAM:    General Appearance: Pleasant, alert, appropriate appearance for age. No acute distress  Head Exam: Normocephalic, without obvious abnormality.  Genitourinary Exam Female:   External genitalia: atrophic vulva  Urinary system: urethral meatus normal  Vagina: mucosa atrophic and pale, slight, superficial erythema at the posterior apex of the vagina.     The size 3 ring pessary was removed, cleaned and reinserted without issue.    IMP: pessary removed for ultrasound and replaced after.    We discussed hysterectomy versus LeFort procedure.  Discussed vaginal hysterectomy, laparoscopic hysterectomy or LeFort, which was scheduled.  We discussed typical recovery of each procedure and the need to avoid lifting for 8 weeks with any prolapse repair surgery.      Plan:  After discussion, she decided to keep her LeFort procedure as scheduled.  Will review ultrasound prior to procedure to make sure it is appropriate.    Ellie Mendoza MD on 12/20/2022 at 10:25 AM         "

## 2022-12-20 NOTE — NURSING NOTE
"Initial /83 (BP Location: Left arm, Patient Position: Chair, Cuff Size: Adult Regular)   Pulse 75   Temp 97.6  F (36.4  C) (Tympanic)   Resp 18   Ht 1.626 m (5' 4\")   Wt 56.7 kg (125 lb)   LMP  (LMP Unknown)   BMI 21.46 kg/m   Estimated body mass index is 21.46 kg/m  as calculated from the following:    Height as of this encounter: 1.626 m (5' 4\").    Weight as of this encounter: 56.7 kg (125 lb). .      "

## 2022-12-20 NOTE — TELEPHONE ENCOUNTER
Appointment scheduled for follow up Thursday 12/22 at 11 am.    Shruti Sanchez   [facility]  RN

## 2022-12-22 ENCOUNTER — OFFICE VISIT (OUTPATIENT)
Dept: OBGYN | Facility: CLINIC | Age: 77
End: 2022-12-22
Payer: COMMERCIAL

## 2022-12-22 VITALS
HEART RATE: 84 BPM | WEIGHT: 125 LBS | BODY MASS INDEX: 21.34 KG/M2 | HEIGHT: 64 IN | TEMPERATURE: 98.2 F | DIASTOLIC BLOOD PRESSURE: 65 MMHG | SYSTOLIC BLOOD PRESSURE: 121 MMHG | RESPIRATION RATE: 18 BRPM

## 2022-12-22 DIAGNOSIS — R93.89 THICKENED ENDOMETRIUM: Primary | ICD-10-CM

## 2022-12-22 PROCEDURE — 99212 OFFICE O/P EST SF 10 MIN: CPT | Mod: 25 | Performed by: OBSTETRICS & GYNECOLOGY

## 2022-12-22 PROCEDURE — 58100 BIOPSY OF UTERUS LINING: CPT | Performed by: OBSTETRICS & GYNECOLOGY

## 2022-12-22 PROCEDURE — 88305 TISSUE EXAM BY PATHOLOGIST: CPT | Performed by: PATHOLOGY

## 2022-12-22 NOTE — NURSING NOTE
"Initial /65 (BP Location: Left arm, Patient Position: Chair, Cuff Size: Adult Regular)   Pulse 84   Temp 98.2  F (36.8  C) (Tympanic)   Resp 18   Ht 1.626 m (5' 4\")   Wt 56.7 kg (125 lb)   LMP  (LMP Unknown)   BMI 21.46 kg/m   Estimated body mass index is 21.46 kg/m  as calculated from the following:    Height as of this encounter: 1.626 m (5' 4\").    Weight as of this encounter: 56.7 kg (125 lb). .      "

## 2022-12-22 NOTE — PROGRESS NOTES
"Ultrasound follow-up visit.    Paz Workman is not complaining of any problems with the pessary. She is here for follow up of her ultrasound that was ordered for preop.  She denies bleeding.      /65 (BP Location: Left arm, Patient Position: Chair, Cuff Size: Adult Regular)   Pulse 84   Temp 98.2  F (36.8  C) (Tympanic)   Resp 18   Ht 1.626 m (5' 4\")   Wt 56.7 kg (125 lb)   LMP  (LMP Unknown)   BMI 21.46 kg/m       EXAM:    General Appearance: Pleasant, alert, appropriate appearance for age. No acute distress  Head Exam: Normocephalic, without obvious abnormality.  Genitourinary Exam Female:   External genitalia: atrophic vulva  Urinary system: urethral meatus normal  Vagina: mucosa atrophic and pale, no excoriations or ulcerations noted.    The size 3 ring pessary was removed, cleaned and reinserted without issue.    Procedure: Endometrial Biopsy.  Indication: thickened endometrial stripe       The consent was signed. The pessary was removed and cleaned.  A speculum was placed in the vagina and the cervix was visualized.  The cervix was cleaned with betadyne x3. A pipelle was inserted.  The uterus sounded to 9 cm.   Adequate sample taken and sent to pathology.    Bleeding was minimal at end of procedure. The patient tolerated the procedure well.  She was discharged with instructions to call for heavy bleeding, foul smelling discharge, fevers, chills, pain or concerns.        IMP: thickened endometrial stripe.  Prolapse     Plan: endometrial biopsy done today.  Will hold on scheduled LeFort until results are back. Will follow up in clinic for preop planning after her trip to [place].  Will likely consider hysterectomy instead of LeFort due to abnormal findings.    Ellie Mendoza MD on 12/22/2022 at 11:50 AM           "

## 2022-12-23 LAB
PATH REPORT.COMMENTS IMP SPEC: NORMAL
PATH REPORT.COMMENTS IMP SPEC: NORMAL
PATH REPORT.FINAL DX SPEC: NORMAL
PATH REPORT.GROSS SPEC: NORMAL
PATH REPORT.MICROSCOPIC SPEC OTHER STN: NORMAL
PATH REPORT.RELEVANT HX SPEC: NORMAL
PHOTO IMAGE: NORMAL

## 2022-12-23 NOTE — TELEPHONE ENCOUNTER
Per Dr. Martinez procedure is canceled.   I notified OR  Updated outlook  Pt is aware.  Does not need to be rescheduled at this time.

## 2023-01-03 ENCOUNTER — PREP FOR PROCEDURE (OUTPATIENT)
Dept: OBGYN | Facility: CLINIC | Age: 78
End: 2023-01-03

## 2023-01-03 ENCOUNTER — OFFICE VISIT (OUTPATIENT)
Dept: OBGYN | Facility: CLINIC | Age: 78
End: 2023-01-03
Payer: COMMERCIAL

## 2023-01-03 VITALS
HEART RATE: 88 BPM | DIASTOLIC BLOOD PRESSURE: 79 MMHG | HEIGHT: 64 IN | WEIGHT: 125 LBS | RESPIRATION RATE: 16 BRPM | BODY MASS INDEX: 21.34 KG/M2 | TEMPERATURE: 97.8 F | SYSTOLIC BLOOD PRESSURE: 133 MMHG

## 2023-01-03 DIAGNOSIS — N81.2 CYSTOCELE WITH INCOMPLETE UTEROVAGINAL PROLAPSE: Primary | ICD-10-CM

## 2023-01-03 DIAGNOSIS — R93.89 THICKENED ENDOMETRIUM: ICD-10-CM

## 2023-01-03 DIAGNOSIS — N81.2 CYSTOCELE WITH SECOND DEGREE UTERINE PROLAPSE: Primary | ICD-10-CM

## 2023-01-03 DIAGNOSIS — R93.89 ENDOMETRIAL THICKENING ON ULTRASOUND: ICD-10-CM

## 2023-01-03 PROCEDURE — 99213 OFFICE O/P EST LOW 20 MIN: CPT | Performed by: OBSTETRICS & GYNECOLOGY

## 2023-01-03 RX ORDER — CEFAZOLIN SODIUM 2 G/100ML
2 INJECTION, SOLUTION INTRAVENOUS SEE ADMIN INSTRUCTIONS
Status: CANCELLED | OUTPATIENT
Start: 2023-01-03

## 2023-01-03 RX ORDER — CEFAZOLIN SODIUM 2 G/100ML
2 INJECTION, SOLUTION INTRAVENOUS
Status: CANCELLED | OUTPATIENT
Start: 2023-01-03

## 2023-01-03 RX ORDER — ACETAMINOPHEN 325 MG/1
975 TABLET ORAL ONCE
Status: CANCELLED | OUTPATIENT
Start: 2023-01-03 | End: 2023-01-03

## 2023-01-03 NOTE — PROGRESS NOTES
"    Chief Complaint   Patient presents with     Follow Up     Discuss surgery options         HPI:  Paz Workman, 78 year old,  presents for follow up of prolapse and thickened endometrium.    Current Outpatient Medications   Medication     amLODIPine (NORVASC) 5 MG tablet     CALCIUM-MAGNESIUM-ZINC PO     Cranberry 600 MG TABS     Glucosamine HCl 1500 MG TABS     Multiple Vitamin (MULTI-VITAMINS) TABS     Multiple Vitamins-Minerals (EYE HEALTH PO)     No current facility-administered medications for this visit.            Allergies   Allergen Reactions     Atorvastatin Muscle Pain (Myalgia)     Ibuprofen Swelling     Lisinopril Other (See Comments)        /79 (BP Location: Right arm, Patient Position: Chair, Cuff Size: Adult Regular)   Pulse 88   Temp 97.8  F (36.6  C) (Tympanic)   Resp 16   Ht 1.626 m (5' 4\")   Wt 56.7 kg (125 lb)   LMP  (LMP Unknown)   BMI 21.46 kg/m            Genitourinary Exam:  Pessary removed  Vulva: normal, no lesions  Urethral meatus: normal size and location, no lesions or discharge,   Urethra: no tenderness or masses  Bladder: no fullness or tenderness  Vagina: nl appearance and rugae, grade 1 cystocele and no rectocele  Cervix: normal appearance, no lesions, no discharge, no cervical motion tenderness, prolapses to 1 cm above the hymen with traction from an allis clamp.  Uterus: normal position, mobile, non-tender  Adnexa: no palpable masses bilaterally          Assessment:  Encounter Diagnoses   Name Primary?     Cystocele with incomplete uterovaginal prolapse Yes     Thickened endometrium            Plan:  1. Her endometrial biopsy showed too few cells to diagnose.  I suspect a uterine polyp.  We discussed hysteroscopy with polypectomy for diagnostic purposes.  She is also wanting her ovaries out for cancer prevention.  We discussed vaginal hysterectomy with bilateral salpgingo-oophorectomy.  We discussed the small chance of needing additional surgery if " malignancy is found.  We also discussed alternatives for prolapse.  Will plan vaginal hysterectomy with bilateral salpingo-oophorectomy, anterior repair and possible laparoscopy if needed to get ovaries removed.  She is concerned about future risks of ovarian cancer due to her history of breast cancer and would like to make sure we remove them.         Total time 20 minutes was spent in review, face-to-face discussion, coordination of care and documentation.        Ellie Mendoza MD ....................  1/3/2023    4:04 PM

## 2023-01-03 NOTE — NURSING NOTE
"Initial /79 (BP Location: Right arm, Patient Position: Chair, Cuff Size: Adult Regular)   Pulse 88   Temp 97.8  F (36.6  C) (Tympanic)   Resp 16   Ht 1.626 m (5' 4\")   Wt 56.7 kg (125 lb)   LMP  (LMP Unknown)   BMI 21.46 kg/m   Estimated body mass index is 21.46 kg/m  as calculated from the following:    Height as of this encounter: 1.626 m (5' 4\").    Weight as of this encounter: 56.7 kg (125 lb). .    Daisha Gregorio, SANDRA    "

## 2023-01-04 ENCOUNTER — ANESTHESIA EVENT (OUTPATIENT)
Dept: SURGERY | Facility: CLINIC | Age: 78
End: 2023-01-04
Payer: COMMERCIAL

## 2023-01-04 ENCOUNTER — TELEPHONE (OUTPATIENT)
Dept: OBGYN | Facility: CLINIC | Age: 78
End: 2023-01-04
Payer: COMMERCIAL

## 2023-01-04 NOTE — ANESTHESIA PREPROCEDURE EVALUATION
Anesthesia Pre-Procedure Evaluation    Patient: Paz Workman   MRN: [id number] : 1945        Procedure : Procedure(s):  HYSTERECTOMY, VAGINAL, WITH SALPINGO-OOPHORECTOMY and anterior repair, possible laparoscopy if needed to remove ovaries          No past medical history on file.   Past Surgical History:   Procedure Laterality Date     cataract surgey Bilateral 2021      Allergies   Allergen Reactions     Atorvastatin Muscle Pain (Myalgia)     Ibuprofen Swelling     Lisinopril Other (See Comments)      Social History     Tobacco Use     Smoking status: Never     Smokeless tobacco: Never   Substance Use Topics     Alcohol use: Yes     Comment: occ      Wt Readings from Last 1 Encounters:   23 56.7 kg (125 lb)        Anesthesia Evaluation   Pt has had prior anesthetic. Type: General and MAC.    History of anesthetic complications  - PONV.      ROS/MED HX  ENT/Pulmonary:  - neg pulmonary ROS     Neurologic: Comment: Hx brain lesion      Cardiovascular:     (+) Dyslipidemia hypertension-----Previous cardiac testing   Echo: Date: Results:    Stress Test: Date: Results:    ECG Reviewed: Date: 2022 Results:  NSR  Cath: Date: Results:      METS/Exercise Tolerance: >4 METS    Hematologic:  - neg hematologic  ROS     Musculoskeletal:   (+) arthritis,     GI/Hepatic:  - neg GI/hepatic ROS     Renal/Genitourinary:     (+) renal disease, type: CRI,     Endo:  - neg endo ROS     Psychiatric/Substance Use:  - neg psychiatric ROS     Infectious Disease:  - neg infectious disease ROS     Malignancy:   (+) Malignancy, History of Breast.Breast CA Remission status post Surgery.        Other:  - neg other ROS          Physical Exam    Airway  airway exam normal      Mallampati: I   TM distance: > 3 FB   Neck ROM: full   Mouth opening: > 3 cm    Respiratory Devices and Support         Dental  no notable dental history         Cardiovascular   cardiovascular exam normal       Rhythm and rate: regular and normal      Pulmonary   pulmonary exam normal        breath sounds clear to auscultation           OUTSIDE LABS:  CBC:   Lab Results   Component Value Date    WBC 9.1 03/08/2022    WBC 11.5 (H) 07/02/2016    HGB 11.2 (L) 03/08/2022    HGB 13.9 07/02/2016    HCT 33.1 (L) 03/08/2022    HCT 41.9 07/02/2016     03/08/2022     07/02/2016     BMP:   Lab Results   Component Value Date     03/08/2022     07/02/2016    POTASSIUM 3.7 03/08/2022    POTASSIUM 3.4 07/02/2016    CHLORIDE 105 03/08/2022    CHLORIDE 107 07/02/2016    CO2 28 03/08/2022    CO2 25 07/02/2016    BUN 13 03/08/2022    BUN 18 07/02/2016    CR 0.90 03/08/2022    CR 0.81 07/02/2016     (H) 03/08/2022     (H) 07/02/2016     COAGS: No results found for: PTT, INR, FIBR  POC: No results found for: BGM, HCG, HCGS  HEPATIC:   Lab Results   Component Value Date    ALBUMIN 3.8 07/02/2016    PROTTOTAL 8.0 07/02/2016    ALT 62 (H) 07/02/2016    AST 42 07/02/2016    ALKPHOS 48 07/02/2016    BILITOTAL 0.9 07/02/2016     OTHER:   Lab Results   Component Value Date    KAREN 9.2 03/08/2022       Anesthesia Plan    ASA Status:  2   NPO Status:  NPO Appropriate    Anesthesia Type: General.     - Airway: ETT   Induction: Intravenous, Propofol.   Maintenance: TIVA.        Consents    Anesthesia Plan(s) and associated risks, benefits, and realistic alternatives discussed. Questions answered and patient/representative(s) expressed understanding.     - Discussed: Risks, Benefits and Alternatives for BOTH SEDATION and the PROCEDURE were discussed     - Discussed with:  Patient      - Extended Intubation/Ventilatory Support Discussed: No.      - Patient is DNR/DNI Status: No    Use of blood products discussed: No .     Postoperative Care    Pain management: IV analgesics, Oral pain medications, Multi-modal analgesia.   PONV prophylaxis: Ondansetron (or other 5HT-3), Dexamethasone or Solumedrol, Background Propofol Infusion     Comments:           H&P  reviewed: Unable to attach H&P to encounter due to EHR limitations. H&P Update: appropriate H&P reviewed, patient examined. No interval changes since H&P (within 30 days).         Monae Galdamez, CRNA, APRN CRNA

## 2023-01-04 NOTE — TELEPHONE ENCOUNTER
"[id number]  Paz Workman    You are now scheduled for surgery at [facility].  Below are the details for your surgery.  Please read the \"Preparing for Your Surgery\" instructions and let us know if you have any questions.    Type of surgery: HYSTERECTOMY, VAGINAL, WITH SALPINGO-OOPHORECTOMY and anterior repair, possible laparoscopy if needed to remove ovaries (Bilateral)     Surgeon:  Ellie Mendoza MD  Location of surgery: [facility] OR    Date of surgery: 1-5-23    Time: 10:00am   Arrival Time: 8:30am    Time can change, to be confirmed a couple of days prior by pre-op surgery nurse.    Pre-Op Appt Date: Patient to schedule with a PCP or Family Practice Provider within 30 days to the surgery.  Post-Op Appt Date:     Packet sent out: Yes  Pre-cert/Authorization completed:  TBD by Financial Securing Office.   MA Sterilization/Hysterectomy Acknowledgment Consent signed: Not Applicable    [facility]  [phone]    Fax: [phone]  Same Day Surgery [phone]  Fax: [phone]  [facility] [phone]    "

## 2023-01-05 ENCOUNTER — HOSPITAL ENCOUNTER (OUTPATIENT)
Facility: CLINIC | Age: 78
Discharge: HOME | End: 2023-01-06
Attending: OBSTETRICS & GYNECOLOGY | Admitting: OBSTETRICS & GYNECOLOGY
Payer: COMMERCIAL

## 2023-01-05 ENCOUNTER — ANESTHESIA (OUTPATIENT)
Dept: SURGERY | Facility: CLINIC | Age: 78
End: 2023-01-05
Payer: COMMERCIAL

## 2023-01-05 DIAGNOSIS — N81.10 VAGINAL WALL PROLAPSE: Primary | ICD-10-CM

## 2023-01-05 LAB
ABO/RH(D): NORMAL
ANTIBODY SCREEN: NEGATIVE
BASOPHILS # BLD AUTO: 0 10E3/UL (ref 0–0.2)
BASOPHILS NFR BLD AUTO: 1 %
EOSINOPHIL # BLD AUTO: 0.1 10E3/UL (ref 0–0.7)
EOSINOPHIL NFR BLD AUTO: 2 %
ERYTHROCYTE [DISTWIDTH] IN BLOOD BY AUTOMATED COUNT: 13.4 % (ref 10–15)
HCT VFR BLD AUTO: 37 % (ref 35–47)
HGB BLD-MCNC: 12.2 G/DL (ref 11.7–15.7)
IMM GRANULOCYTES # BLD: 0 10E3/UL
IMM GRANULOCYTES NFR BLD: 0 %
LYMPHOCYTES # BLD AUTO: 1.6 10E3/UL (ref 0.8–5.3)
LYMPHOCYTES NFR BLD AUTO: 34 %
MCH RBC QN AUTO: 31 PG (ref 26.5–33)
MCHC RBC AUTO-ENTMCNC: 33 G/DL (ref 31.5–36.5)
MCV RBC AUTO: 94 FL (ref 78–100)
MONOCYTES # BLD AUTO: 0.3 10E3/UL (ref 0–1.3)
MONOCYTES NFR BLD AUTO: 7 %
NEUTROPHILS # BLD AUTO: 2.6 10E3/UL (ref 1.6–8.3)
NEUTROPHILS NFR BLD AUTO: 56 %
NRBC # BLD AUTO: 0 10E3/UL
NRBC BLD AUTO-RTO: 0 /100
PLATELET # BLD AUTO: 204 10E3/UL (ref 150–450)
RBC # BLD AUTO: 3.93 10E6/UL (ref 3.8–5.2)
SPECIMEN EXPIRATION DATE: NORMAL
WBC # BLD AUTO: 4.7 10E3/UL (ref 4–11)

## 2023-01-05 PROCEDURE — 88377 M/PHMTRC ALYS ISHQUANT/SEMIQ: CPT | Performed by: OBSTETRICS & GYNECOLOGY

## 2023-01-05 PROCEDURE — 88377 M/PHMTRC ALYS ISHQUANT/SEMIQ: CPT | Mod: 26 | Performed by: MEDICAL GENETICS

## 2023-01-05 PROCEDURE — 57240 ANTERIOR COLPORRHAPHY: CPT | Mod: 51 | Performed by: OBSTETRICS & GYNECOLOGY

## 2023-01-05 PROCEDURE — 88360 TUMOR IMMUNOHISTOCHEM/MANUAL: CPT | Mod: 26 | Performed by: PATHOLOGY

## 2023-01-05 PROCEDURE — 58262 VAG HYST INCLUDING T/O: CPT | Mod: 80 | Performed by: STUDENT IN AN ORGANIZED HEALTH CARE EDUCATION/TRAINING PROGRAM

## 2023-01-05 PROCEDURE — 271N000001 HC OR GENERAL SUPPLY NON-STERILE: Performed by: OBSTETRICS & GYNECOLOGY

## 2023-01-05 PROCEDURE — 86901 BLOOD TYPING SEROLOGIC RH(D): CPT | Performed by: OBSTETRICS & GYNECOLOGY

## 2023-01-05 PROCEDURE — 999N000141 HC STATISTIC PRE-PROCEDURE NURSING ASSESSMENT: Performed by: OBSTETRICS & GYNECOLOGY

## 2023-01-05 PROCEDURE — 258N000003 HC RX IP 258 OP 636: Performed by: NURSE ANESTHETIST, CERTIFIED REGISTERED

## 2023-01-05 PROCEDURE — 88341 IMHCHEM/IMCYTCHM EA ADD ANTB: CPT | Mod: 26 | Performed by: PATHOLOGY

## 2023-01-05 PROCEDURE — 88305 TISSUE EXAM BY PATHOLOGIST: CPT | Mod: 26 | Performed by: PATHOLOGY

## 2023-01-05 PROCEDURE — 250N000013 HC RX MED GY IP 250 OP 250 PS 637: Performed by: OBSTETRICS & GYNECOLOGY

## 2023-01-05 PROCEDURE — 250N000009 HC RX 250: Performed by: NURSE ANESTHETIST, CERTIFIED REGISTERED

## 2023-01-05 PROCEDURE — 272N000001 HC OR GENERAL SUPPLY STERILE: Performed by: OBSTETRICS & GYNECOLOGY

## 2023-01-05 PROCEDURE — 57240 ANTERIOR COLPORRHAPHY: CPT | Mod: 80 | Performed by: STUDENT IN AN ORGANIZED HEALTH CARE EDUCATION/TRAINING PROGRAM

## 2023-01-05 PROCEDURE — 710N000009 HC RECOVERY PHASE 1, LEVEL 1, PER MIN: Performed by: OBSTETRICS & GYNECOLOGY

## 2023-01-05 PROCEDURE — 360N000077 HC SURGERY LEVEL 4, PER MIN: Performed by: OBSTETRICS & GYNECOLOGY

## 2023-01-05 PROCEDURE — 88342 IMHCHEM/IMCYTCHM 1ST ANTB: CPT | Mod: 26 | Performed by: PATHOLOGY

## 2023-01-05 PROCEDURE — 88342 IMHCHEM/IMCYTCHM 1ST ANTB: CPT | Mod: TC | Performed by: OBSTETRICS & GYNECOLOGY

## 2023-01-05 PROCEDURE — 370N000017 HC ANESTHESIA TECHNICAL FEE, PER MIN: Performed by: OBSTETRICS & GYNECOLOGY

## 2023-01-05 PROCEDURE — 58262 VAG HYST INCLUDING T/O: CPT | Performed by: OBSTETRICS & GYNECOLOGY

## 2023-01-05 PROCEDURE — 250N000011 HC RX IP 250 OP 636: Performed by: NURSE ANESTHETIST, CERTIFIED REGISTERED

## 2023-01-05 PROCEDURE — 250N000009 HC RX 250: Performed by: OBSTETRICS & GYNECOLOGY

## 2023-01-05 PROCEDURE — 250N000013 HC RX MED GY IP 250 OP 250 PS 637: Performed by: NURSE ANESTHETIST, CERTIFIED REGISTERED

## 2023-01-05 PROCEDURE — 250N000011 HC RX IP 250 OP 636: Performed by: OBSTETRICS & GYNECOLOGY

## 2023-01-05 PROCEDURE — 85025 COMPLETE CBC W/AUTO DIFF WBC: CPT | Performed by: OBSTETRICS & GYNECOLOGY

## 2023-01-05 PROCEDURE — 36415 COLL VENOUS BLD VENIPUNCTURE: CPT | Performed by: OBSTETRICS & GYNECOLOGY

## 2023-01-05 RX ORDER — BISACODYL 10 MG
10 SUPPOSITORY, RECTAL RECTAL DAILY PRN
Status: DISCONTINUED | OUTPATIENT
Start: 2023-01-05 | End: 2023-01-06 | Stop reason: HOSPADM

## 2023-01-05 RX ORDER — NALOXONE HYDROCHLORIDE 0.4 MG/ML
0.4 INJECTION, SOLUTION INTRAMUSCULAR; INTRAVENOUS; SUBCUTANEOUS
Status: DISCONTINUED | OUTPATIENT
Start: 2023-01-05 | End: 2023-01-06 | Stop reason: HOSPADM

## 2023-01-05 RX ORDER — CEFAZOLIN SODIUM/WATER 2 G/20 ML
2 SYRINGE (ML) INTRAVENOUS
Status: COMPLETED | OUTPATIENT
Start: 2023-01-05 | End: 2023-01-05

## 2023-01-05 RX ORDER — FENTANYL CITRATE 50 UG/ML
INJECTION, SOLUTION INTRAMUSCULAR; INTRAVENOUS PRN
Status: DISCONTINUED | OUTPATIENT
Start: 2023-01-05 | End: 2023-01-05

## 2023-01-05 RX ORDER — ONDANSETRON 4 MG/1
4 TABLET, ORALLY DISINTEGRATING ORAL EVERY 30 MIN PRN
Status: DISCONTINUED | OUTPATIENT
Start: 2023-01-05 | End: 2023-01-05 | Stop reason: HOSPADM

## 2023-01-05 RX ORDER — ACETAMINOPHEN 325 MG/1
975 TABLET ORAL EVERY 6 HOURS PRN
Qty: 50 TABLET | Refills: 0
Start: 2023-01-05

## 2023-01-05 RX ORDER — ONDANSETRON 2 MG/ML
4 INJECTION INTRAMUSCULAR; INTRAVENOUS EVERY 30 MIN PRN
Status: DISCONTINUED | OUTPATIENT
Start: 2023-01-05 | End: 2023-01-05 | Stop reason: HOSPADM

## 2023-01-05 RX ORDER — AMOXICILLIN 250 MG
1 CAPSULE ORAL 2 TIMES DAILY
Status: DISCONTINUED | OUTPATIENT
Start: 2023-01-05 | End: 2023-01-06 | Stop reason: HOSPADM

## 2023-01-05 RX ORDER — OXYCODONE HYDROCHLORIDE 5 MG/1
5-10 TABLET ORAL EVERY 4 HOURS PRN
Qty: 6 TABLET | Refills: 0 | Status: SHIPPED | OUTPATIENT
Start: 2023-01-05 | End: 2023-01-11

## 2023-01-05 RX ORDER — MEPERIDINE HYDROCHLORIDE 25 MG/ML
12.5 INJECTION INTRAMUSCULAR; INTRAVENOUS; SUBCUTANEOUS
Status: DISCONTINUED | OUTPATIENT
Start: 2023-01-05 | End: 2023-01-05 | Stop reason: HOSPADM

## 2023-01-05 RX ORDER — CEFAZOLIN SODIUM/WATER 2 G/20 ML
2 SYRINGE (ML) INTRAVENOUS SEE ADMIN INSTRUCTIONS
Status: DISCONTINUED | OUTPATIENT
Start: 2023-01-05 | End: 2023-01-05 | Stop reason: HOSPADM

## 2023-01-05 RX ORDER — GINSENG 100 MG
CAPSULE ORAL PRN
Status: DISCONTINUED | OUTPATIENT
Start: 2023-01-05 | End: 2023-01-05 | Stop reason: HOSPADM

## 2023-01-05 RX ORDER — ONDANSETRON 2 MG/ML
INJECTION INTRAMUSCULAR; INTRAVENOUS PRN
Status: DISCONTINUED | OUTPATIENT
Start: 2023-01-05 | End: 2023-01-05

## 2023-01-05 RX ORDER — ONDANSETRON 4 MG/1
4 TABLET, ORALLY DISINTEGRATING ORAL EVERY 6 HOURS PRN
Status: DISCONTINUED | OUTPATIENT
Start: 2023-01-05 | End: 2023-01-06 | Stop reason: HOSPADM

## 2023-01-05 RX ORDER — ACETAMINOPHEN 325 MG/1
975 TABLET ORAL ONCE
Status: COMPLETED | OUTPATIENT
Start: 2023-01-05 | End: 2023-01-05

## 2023-01-05 RX ORDER — KETAMINE HYDROCHLORIDE 10 MG/ML
INJECTION INTRAMUSCULAR; INTRAVENOUS PRN
Status: DISCONTINUED | OUTPATIENT
Start: 2023-01-05 | End: 2023-01-05

## 2023-01-05 RX ORDER — ONDANSETRON 2 MG/ML
4 INJECTION INTRAMUSCULAR; INTRAVENOUS EVERY 6 HOURS PRN
Status: DISCONTINUED | OUTPATIENT
Start: 2023-01-05 | End: 2023-01-06 | Stop reason: HOSPADM

## 2023-01-05 RX ORDER — PHENYLEPHRINE HYDROCHLORIDE 10 MG/ML
INJECTION INTRAVENOUS PRN
Status: DISCONTINUED | OUTPATIENT
Start: 2023-01-05 | End: 2023-01-05

## 2023-01-05 RX ORDER — PROPOFOL 10 MG/ML
INJECTION, EMULSION INTRAVENOUS PRN
Status: DISCONTINUED | OUTPATIENT
Start: 2023-01-05 | End: 2023-01-05

## 2023-01-05 RX ORDER — LIDOCAINE HYDROCHLORIDE 10 MG/ML
INJECTION, SOLUTION INFILTRATION; PERINEURAL PRN
Status: DISCONTINUED | OUTPATIENT
Start: 2023-01-05 | End: 2023-01-05

## 2023-01-05 RX ORDER — HYDROMORPHONE HCL IN WATER/PF 6 MG/30 ML
0.2 PATIENT CONTROLLED ANALGESIA SYRINGE INTRAVENOUS EVERY 5 MIN PRN
Status: DISCONTINUED | OUTPATIENT
Start: 2023-01-05 | End: 2023-01-05 | Stop reason: HOSPADM

## 2023-01-05 RX ORDER — OXYCODONE HYDROCHLORIDE 5 MG/1
10 TABLET ORAL EVERY 4 HOURS PRN
Status: DISCONTINUED | OUTPATIENT
Start: 2023-01-05 | End: 2023-01-06 | Stop reason: HOSPADM

## 2023-01-05 RX ORDER — PROCHLORPERAZINE MALEATE 5 MG
5 TABLET ORAL EVERY 6 HOURS PRN
Status: DISCONTINUED | OUTPATIENT
Start: 2023-01-05 | End: 2023-01-06 | Stop reason: HOSPADM

## 2023-01-05 RX ORDER — SODIUM CHLORIDE, SODIUM LACTATE, POTASSIUM CHLORIDE, CALCIUM CHLORIDE 600; 310; 30; 20 MG/100ML; MG/100ML; MG/100ML; MG/100ML
INJECTION, SOLUTION INTRAVENOUS CONTINUOUS
Status: DISCONTINUED | OUTPATIENT
Start: 2023-01-05 | End: 2023-01-05 | Stop reason: HOSPADM

## 2023-01-05 RX ORDER — HYDROMORPHONE HCL IN WATER/PF 6 MG/30 ML
0.2 PATIENT CONTROLLED ANALGESIA SYRINGE INTRAVENOUS
Status: DISCONTINUED | OUTPATIENT
Start: 2023-01-05 | End: 2023-01-06 | Stop reason: HOSPADM

## 2023-01-05 RX ORDER — ACETAMINOPHEN 325 MG/1
975 TABLET ORAL ONCE
Status: DISCONTINUED | OUTPATIENT
Start: 2023-01-05 | End: 2023-01-05

## 2023-01-05 RX ORDER — GABAPENTIN 100 MG/1
100 CAPSULE ORAL
Status: COMPLETED | OUTPATIENT
Start: 2023-01-05 | End: 2023-01-05

## 2023-01-05 RX ORDER — NALOXONE HYDROCHLORIDE 0.4 MG/ML
0.2 INJECTION, SOLUTION INTRAMUSCULAR; INTRAVENOUS; SUBCUTANEOUS
Status: DISCONTINUED | OUTPATIENT
Start: 2023-01-05 | End: 2023-01-06 | Stop reason: HOSPADM

## 2023-01-05 RX ORDER — MAGNESIUM HYDROXIDE 1200 MG/15ML
LIQUID ORAL PRN
Status: DISCONTINUED | OUTPATIENT
Start: 2023-01-05 | End: 2023-01-05 | Stop reason: HOSPADM

## 2023-01-05 RX ORDER — DEXAMETHASONE SODIUM PHOSPHATE 4 MG/ML
INJECTION, SOLUTION INTRA-ARTICULAR; INTRALESIONAL; INTRAMUSCULAR; INTRAVENOUS; SOFT TISSUE PRN
Status: DISCONTINUED | OUTPATIENT
Start: 2023-01-05 | End: 2023-01-05

## 2023-01-05 RX ORDER — HYDROMORPHONE HCL IN WATER/PF 6 MG/30 ML
0.4 PATIENT CONTROLLED ANALGESIA SYRINGE INTRAVENOUS
Status: DISCONTINUED | OUTPATIENT
Start: 2023-01-05 | End: 2023-01-06 | Stop reason: HOSPADM

## 2023-01-05 RX ORDER — LIDOCAINE 40 MG/G
CREAM TOPICAL
Status: DISCONTINUED | OUTPATIENT
Start: 2023-01-05 | End: 2023-01-06 | Stop reason: HOSPADM

## 2023-01-05 RX ORDER — ACETAMINOPHEN 325 MG/1
650 TABLET ORAL EVERY 6 HOURS
Status: DISCONTINUED | OUTPATIENT
Start: 2023-01-08 | End: 2023-01-06 | Stop reason: HOSPADM

## 2023-01-05 RX ORDER — GLYCOPYRROLATE 0.2 MG/ML
INJECTION, SOLUTION INTRAMUSCULAR; INTRAVENOUS PRN
Status: DISCONTINUED | OUTPATIENT
Start: 2023-01-05 | End: 2023-01-05

## 2023-01-05 RX ORDER — PROPOFOL 10 MG/ML
INJECTION, EMULSION INTRAVENOUS CONTINUOUS PRN
Status: DISCONTINUED | OUTPATIENT
Start: 2023-01-05 | End: 2023-01-05

## 2023-01-05 RX ORDER — AMOXICILLIN 250 MG
1-2 CAPSULE ORAL 2 TIMES DAILY
Qty: 30 TABLET | Refills: 0 | Status: SHIPPED | OUTPATIENT
Start: 2023-01-05 | End: 2023-01-11

## 2023-01-05 RX ORDER — HYDROMORPHONE HCL IN WATER/PF 6 MG/30 ML
0.4 PATIENT CONTROLLED ANALGESIA SYRINGE INTRAVENOUS EVERY 5 MIN PRN
Status: DISCONTINUED | OUTPATIENT
Start: 2023-01-05 | End: 2023-01-05 | Stop reason: HOSPADM

## 2023-01-05 RX ORDER — LIDOCAINE 40 MG/G
CREAM TOPICAL
Status: DISCONTINUED | OUTPATIENT
Start: 2023-01-05 | End: 2023-01-05 | Stop reason: HOSPADM

## 2023-01-05 RX ORDER — ACETAMINOPHEN 325 MG/1
975 TABLET ORAL EVERY 6 HOURS
Status: DISCONTINUED | OUTPATIENT
Start: 2023-01-05 | End: 2023-01-06 | Stop reason: HOSPADM

## 2023-01-05 RX ORDER — ONDANSETRON 4 MG/1
4-8 TABLET, ORALLY DISINTEGRATING ORAL EVERY 8 HOURS PRN
Qty: 4 TABLET | Refills: 0 | Status: SHIPPED | OUTPATIENT
Start: 2023-01-05 | End: 2023-01-11

## 2023-01-05 RX ORDER — OXYCODONE HYDROCHLORIDE 5 MG/1
5 TABLET ORAL EVERY 4 HOURS PRN
Status: DISCONTINUED | OUTPATIENT
Start: 2023-01-05 | End: 2023-01-06 | Stop reason: HOSPADM

## 2023-01-05 RX ORDER — FENTANYL CITRATE-0.9 % NACL/PF 10 MCG/ML
100 PLASTIC BAG, INJECTION (ML) INTRAVENOUS EVERY 10 MIN PRN
Status: DISCONTINUED | OUTPATIENT
Start: 2023-01-05 | End: 2023-01-06 | Stop reason: HOSPADM

## 2023-01-05 RX ORDER — FENTANYL CITRATE 50 UG/ML
25 INJECTION, SOLUTION INTRAMUSCULAR; INTRAVENOUS
Status: CANCELLED | OUTPATIENT
Start: 2023-01-05

## 2023-01-05 RX ORDER — HYDROXYZINE HYDROCHLORIDE 10 MG/1
10 TABLET, FILM COATED ORAL EVERY 6 HOURS PRN
Status: DISCONTINUED | OUTPATIENT
Start: 2023-01-05 | End: 2023-01-06 | Stop reason: HOSPADM

## 2023-01-05 RX ADMIN — PHENYLEPHRINE HYDROCHLORIDE 100 MCG: 10 INJECTION INTRAVENOUS at 09:48

## 2023-01-05 RX ADMIN — PHENYLEPHRINE HYDROCHLORIDE 100 MCG: 10 INJECTION INTRAVENOUS at 09:09

## 2023-01-05 RX ADMIN — ACETAMINOPHEN 975 MG: 325 TABLET, FILM COATED ORAL at 22:32

## 2023-01-05 RX ADMIN — GLYCOPYRROLATE 0.2 MG: 0.2 INJECTION, SOLUTION INTRAMUSCULAR; INTRAVENOUS at 10:25

## 2023-01-05 RX ADMIN — DEXAMETHASONE SODIUM PHOSPHATE 8 MG: 4 INJECTION, SOLUTION INTRA-ARTICULAR; INTRALESIONAL; INTRAMUSCULAR; INTRAVENOUS; SOFT TISSUE at 08:56

## 2023-01-05 RX ADMIN — LIDOCAINE HYDROCHLORIDE 50 MG: 10 INJECTION, SOLUTION INFILTRATION; PERINEURAL at 08:56

## 2023-01-05 RX ADMIN — KETAMINE HYDROCHLORIDE 10 MG: 10 INJECTION, SOLUTION INTRAMUSCULAR; INTRAVENOUS at 09:26

## 2023-01-05 RX ADMIN — LIDOCAINE HYDROCHLORIDE 0.1 ML: 10 INJECTION, SOLUTION EPIDURAL; INFILTRATION; INTRACAUDAL; PERINEURAL at 06:52

## 2023-01-05 RX ADMIN — PHENYLEPHRINE HYDROCHLORIDE 100 MCG: 10 INJECTION INTRAVENOUS at 09:26

## 2023-01-05 RX ADMIN — ACETAMINOPHEN 975 MG: 325 TABLET, FILM COATED ORAL at 16:49

## 2023-01-05 RX ADMIN — PHENYLEPHRINE HYDROCHLORIDE 100 MCG: 10 INJECTION INTRAVENOUS at 09:22

## 2023-01-05 RX ADMIN — PHENYLEPHRINE HYDROCHLORIDE 100 MCG: 10 INJECTION INTRAVENOUS at 10:47

## 2023-01-05 RX ADMIN — PHENYLEPHRINE HYDROCHLORIDE 100 MCG: 10 INJECTION INTRAVENOUS at 09:16

## 2023-01-05 RX ADMIN — PROPOFOL 150 MG: 10 INJECTION, EMULSION INTRAVENOUS at 08:56

## 2023-01-05 RX ADMIN — MIDAZOLAM 2 MG: 1 INJECTION INTRAMUSCULAR; INTRAVENOUS at 08:54

## 2023-01-05 RX ADMIN — KETAMINE HYDROCHLORIDE 20 MG: 10 INJECTION, SOLUTION INTRAMUSCULAR; INTRAVENOUS at 09:17

## 2023-01-05 RX ADMIN — SODIUM CHLORIDE, POTASSIUM CHLORIDE, SODIUM LACTATE AND CALCIUM CHLORIDE: 600; 310; 30; 20 INJECTION, SOLUTION INTRAVENOUS at 06:52

## 2023-01-05 RX ADMIN — GABAPENTIN 100 MG: 100 CAPSULE ORAL at 07:09

## 2023-01-05 RX ADMIN — FENTANYL CITRATE 100 MCG: 50 INJECTION, SOLUTION INTRAMUSCULAR; INTRAVENOUS at 08:56

## 2023-01-05 RX ADMIN — ACETAMINOPHEN 975 MG: 325 TABLET, FILM COATED ORAL at 07:09

## 2023-01-05 RX ADMIN — PHENYLEPHRINE HYDROCHLORIDE 100 MCG: 10 INJECTION INTRAVENOUS at 09:12

## 2023-01-05 RX ADMIN — PROPOFOL 200 MCG/KG/MIN: 10 INJECTION, EMULSION INTRAVENOUS at 08:56

## 2023-01-05 RX ADMIN — PHENYLEPHRINE HYDROCHLORIDE 100 MCG: 10 INJECTION INTRAVENOUS at 09:52

## 2023-01-05 RX ADMIN — OXYCODONE HYDROCHLORIDE 5 MG: 5 TABLET ORAL at 14:51

## 2023-01-05 RX ADMIN — ONDANSETRON 4 MG: 2 INJECTION INTRAMUSCULAR; INTRAVENOUS at 08:56

## 2023-01-05 RX ADMIN — KETAMINE HYDROCHLORIDE 20 MG: 10 INJECTION, SOLUTION INTRAMUSCULAR; INTRAVENOUS at 09:09

## 2023-01-05 RX ADMIN — SENNOSIDES AND DOCUSATE SODIUM 1 TABLET: 50; 8.6 TABLET ORAL at 16:49

## 2023-01-05 RX ADMIN — ROCURONIUM BROMIDE 50 MG: 50 INJECTION, SOLUTION INTRAVENOUS at 08:56

## 2023-01-05 RX ADMIN — Medication 2 G: at 08:49

## 2023-01-05 ASSESSMENT — ACTIVITIES OF DAILY LIVING (ADL)
ADLS_ACUITY_SCORE: 18

## 2023-01-05 NOTE — PROGRESS NOTES
[facility] ADMISSION NOTE    Patient admitted to room 2045 at approximately 1415 via cart from surgery. Patient was accompanied by transport tech.     Verbal SBAR report received from MELISSA Minor from Same Day surgery prior to patient arrival.     Patient trasferred to bed via air manasa. Patient alert and oriented X 3. Pain is controlled without any medications.  . Admission vital signs: Blood pressure 126/69, pulse 85, temperature 99.2  F (37.3  C), temperature source Oral, resp. rate 16, SpO2 96 %, not currently breastfeeding. Patient was oriented to plan of care, call light, bed controls, tv, telephone, bathroom and visiting hours.     Risk Assessment    The following safety risks were identified during admission: fall. Yellow risk band applied: YES.     Skin Initial Assessment    This writer admitted this patient and completed a full skin assessment and Leandro score in the Adult PCS flowsheet. Appropriate interventions initiated as needed.     Secondary skin check completed by MELISSA Enriquez.    Leandro Risk Assessment  Sensory Perception: 3-->slightly limited  Moisture: 3-->occasionally moist  Activity: 1-->bedfast  Mobility: 3-->slightly limited  Nutrition: 2-->probably inadequate  Friction and Shear: 3-->no apparent problem  Leandro Score: 15  Sensory/Activity/Mobility Interventions: Encourage activity/OOB  Moisture Interventions: Incontinence pad  Nutrition Interventions: Maintain adequate hydration  Mattress: Standard gel/foam mattress (IsoFlex, Atmos Air, etc.)  Bed Frame: Standard width and length    Education    Patient has a [place] to Observation order: Yes  Observation education completed and documented: Discussed with pt, spouse and Dtr while in PACU.       Charlotte Rasmussen RN

## 2023-01-05 NOTE — PROCEDURES
1/4/2023     Operative Report       PREOPERATIVE DIAGNOSIS:  Pelvic relaxation with cystocele and uterine prolapse, thickened endometrial stripe.  Endometrial biopsy insufficient for diagnosis.     POSTOPERATIVE DIAGNOSIS:  Same       PROCEDURE:  Vaginal hysterectomy, bilateral salpingo-oophorectomy, anterior repair      SURGEON:  Ellie Martinez MD      ASSIST: Dr. Fernandes and Boris Coyle   This assistance of this surgeon was required due to the need for additional skilled surgical hands to retract and hold instruments due to the nature of the surgical procedure and risk of complications.      ANESTHESIA:  General endotracheal tube     Estimated blood loss was 20 cc.       Specimens: uterus, fallopian tubes and ovaries     PROCEDURE:  The patient brought in the operating room.  GETA was initiated without difficulty.  She was placed in the dorsal lithotomy position in Barrett stirrups.        Pelvic exam revealed pelvic relaxation. She was  then prepped and draped in usual manner. A posterior weighted  speculum was placed in the vagina. The cervix was grasped with a  single tooth tenaculum, and it presented to the introitus at the  start of the procedure. The cervix was infiltrated with 0.25% marcaine with epinephrine.  Approximately 10 cc total was placed initially around  the cervix. The cervix was circumscribed with a knife, and the  vaginal mucosa was dissected up off the lower part of the cervix.  Posteriorly the attempt at entering the posterior cul-de-sac  initially was successful . Uterosacral ligaments were grasped  on either side, cut, and then ligated with 0-vicryl suture in a  Ana fashion.  The posterior weighted speculum was placed, and the anterior cul-de-sac was entered into sharply with a metzenbaum scissors.  Another pedicle was created on either side with the ligasure   taking care to stay close to the cervix. Several additional bites were taken, hugging close to the cervix with the ligasure.  The  uteroovarian ligaments were clamped with a curved Ana, ligated and tied with O vicryl.  The last blade placement on either side incorporated the infundibulopelvic ligament on both sides. This ligament was doubly ligated with O vicryl.   Hemostasis was assured. A modified Chi stitch was placed with 2.0 Vicryl.  This was started at the 12 o'clock position taking a bite through the vaginal mucosa and incorporating the peritoneum.  A purse-string stitch was carried around the peritoneum incorporating the uterosacral ligaments bilaterally.  This was tied down.  The vaginal mucosa was then closed from anterior to posterior with 0 Vicryl.  Hemostasis was assured.    The anterior repair was then completed by grasping the vaginal cuff anteriorly with an  Allis clamps and then infiltrating the cystocele with again 0.25% marcaine with epinephrine  for hydrodissection with an Allis clamp being placed in the midline to provide countertraction. The vaginal mucosa was dissected off the underlying cystocele with sharp dissection with scissors, and the cystocele was dissected away from the vaginal mucosa on either  side with sponge dissection.  The cystocele was isolated, and then the vesicovaginal fascia was  reapproximated in the midline with 6 plication stitches of 0 Vicryl suture in interrupted  fashion. The excessive vaginal mucosa was  excised, and the vaginal mucosal edges were reapproximated with 2-0  vicryl suture in an running fashion. Hemostasis was assured.  Support was obtained, and there was good depth of the vagina. The vagina was packed with guaze moistened with bacitracin.      Porras was placed and drained clear urine at the end of the procedure. The patient was awakened and taken to her room in stable condition.  Sponge, lap and instrument counts were correct x2.      Dr. Fernandes and Boris Coyle actively first assisted during this surgery to provide nessessary retraction and exposure as well as maintaining  hemostasis and a clear operative field. This was helpful in allowing the operative to proceed in a safe and expeditious manner. Dr. Fernandes and Boris Coyle were present for the entire duration of the surgery.    Ellie Martinez MD ....................  1/4/2023   10:42 AM

## 2023-01-05 NOTE — OR NURSING
Oral airway removed after pt pushed it out of her mouth with her tongue. VSS. BP 90/51. HR 64.Resp 20 .Sats 99% on 8L/NC.Pt will arouse with name called and open her eyes but quickly closes them again.

## 2023-01-05 NOTE — PROGRESS NOTES
Pt continued to be very sleepy with a long time spent recovering after anesthesia. After some intervention patient has been stable for over an hour, CRNA to bedside to evaluate for clearance to go to the floor. Pt alert when stimulated by voice. Pt complaining of some cramping, but is mild. Able to make needs known and stable without intervention. OB alerted to need for closer monitoring while inpatient, CRNA and OB floor verbal ok to transfer. No other issues or concerns at time of transfer. Daughter updated and all questions answered.

## 2023-01-05 NOTE — OR NURSING
Upon arrival to PACU  Pt started o2 desating in the high low 70's. Apenic. Oral airway placed. Stated bagging pt when CRNA arrived. Pt slightly hypotensive. Sating currently at 100% via face mask with 10L/NC. HR in the 60's. Oral airway remains in. Pt remains sedated.

## 2023-01-05 NOTE — ANESTHESIA CARE TRANSFER NOTE
Patient: Paz Workman    Procedure: Procedure(s):  HYSTERECTOMY, VAGINAL, WITH SALPINGO-OOPHORECTOMY and anterior repair, possible laparoscopy if needed to remove ovaries       Diagnosis: Cystocele with second degree uterine prolapse [N81.2]  Endometrial thickening on ultrasound [R93.89]  Diagnosis Additional Information: No value filed.    Anesthesia Type:   General     Note:    Oropharynx: oropharynx clear of all foreign objects and spontaneously breathing  Level of Consciousness: drowsy  Oxygen Supplementation: face mask  Level of Supplemental Oxygen (L/min / FiO2): 5  Independent Airway: airway patency satisfactory and stable  Dentition: dentition unchanged  Vital Signs Stable: post-procedure vital signs reviewed and stable  Report to RN Given: handoff report given  Patient transferred to: PACU    Handoff Report: Identifed the Patient, Identified the Reponsible Provider, Reviewed the pertinent medical history, Discussed the surgical course, Reviewed Intra-OP anesthesia mangement and issues during anesthesia, Set expectations for post-procedure period and Allowed opportunity for questions and acknowledgement of understanding      Vitals:  Vitals Value Taken Time   BP     Temp     Pulse     Resp     SpO2         Electronically Signed By: Valeriano Gusman CRNA, APRN CRNA  January 5, 2023  10:36 AM

## 2023-01-05 NOTE — H&P
Please see outpatient H&P.    The history and physical was reviewed, the patient was seen and no changes have occurred since the H&P was completed.

## 2023-01-05 NOTE — OR NURSING
Labs drawn. Vss. Pt here early. Ready for surgery. No one with pt preop. Pt alert and doing well. Pre op meds given. Iv started.

## 2023-01-05 NOTE — ANESTHESIA POSTPROCEDURE EVALUATION
Patient: Paz Workman    Procedure: Procedure(s):  HYSTERECTOMY, VAGINAL, WITH SALPINGO-OOPHORECTOMY and anterior repair,       Anesthesia Type:  General    Note:  Disposition: Inpatient   Postop Pain Control: Uneventful            Sign Out: Well controlled pain   PONV: No   Neuro/Psych: Uneventful            Sign Out: Acceptable/Baseline neuro status   Airway/Respiratory: Uneventful            Sign Out: Acceptable/Baseline resp. status   CV/Hemodynamics: Uneventful            Sign Out: Acceptable CV status; No obvious hypovolemia; No obvious fluid overload   Other NRE: NONE   DID A NON-ROUTINE EVENT OCCUR? No           Last vitals:  Vitals Value Taken Time   /58 01/05/23 1315   Temp 36.4  C (97.6  F) 01/05/23 1050   Pulse 88 01/05/23 1321   Resp 11 01/05/23 1321   SpO2 100 % 01/05/23 1321   Vitals shown include unvalidated device data.    Electronically Signed By: Valeriano Gusman CRNA, APRN CRNA  January 5, 2023  1:22 PM

## 2023-01-06 VITALS
OXYGEN SATURATION: 95 % | HEART RATE: 75 BPM | RESPIRATION RATE: 16 BRPM | DIASTOLIC BLOOD PRESSURE: 62 MMHG | TEMPERATURE: 98.1 F | SYSTOLIC BLOOD PRESSURE: 119 MMHG

## 2023-01-06 LAB — HGB BLD-MCNC: 11.3 G/DL (ref 11.7–15.7)

## 2023-01-06 PROCEDURE — 82962 GLUCOSE BLOOD TEST: CPT

## 2023-01-06 PROCEDURE — 85018 HEMOGLOBIN: CPT | Performed by: OBSTETRICS & GYNECOLOGY

## 2023-01-06 PROCEDURE — 250N000013 HC RX MED GY IP 250 OP 250 PS 637: Performed by: OBSTETRICS & GYNECOLOGY

## 2023-01-06 PROCEDURE — 36415 COLL VENOUS BLD VENIPUNCTURE: CPT | Performed by: OBSTETRICS & GYNECOLOGY

## 2023-01-06 RX ADMIN — ACETAMINOPHEN 975 MG: 325 TABLET, FILM COATED ORAL at 05:07

## 2023-01-06 RX ADMIN — SENNOSIDES AND DOCUSATE SODIUM 1 TABLET: 50; 8.6 TABLET ORAL at 08:11

## 2023-01-06 ASSESSMENT — ACTIVITIES OF DAILY LIVING (ADL)
ADLS_ACUITY_SCORE: 18

## 2023-01-06 NOTE — PROGRESS NOTES
Pt alert and oriented x 4, RASS score of 0. Has received Oxycodone 5 mg x 1, declined further use this evening. Scheduled Tylenol 975 mg given and given hot packs for comfort, interventions have been successful, dull cramping rating 4-5 out of 10. Pt has advanced in diet, tolerating well, PO intake adequate, saline locked. Output 700 ml at 1900. Pt requested to ambulate this evening, informed oncoming staff and currently assisting with transfer and ambulation.     Charlotte Rasmussen RN on 1/5/2023 at 7:25 PM

## 2023-01-06 NOTE — DISCHARGE SUMMARY
Admit date :1/5/2023  Discharge date: 1/6/2023      Admit diagnosis:  Uterine prolapse with cystocele    Discharge diagnosis: same - present on admission,  Postop vaginal hysterectomy with bilateral salpingo-oophorectomy, anterior repair    Hospital course:    Paz Workman 78 year old was admitted for avaginal hysterectomy with bilateral salpingo-oophorectomy, anterior repair .  This was performed and was uncomplicated.  Post-operatively she did well.   She was discharged home on POD #1 with prescriptions for Tylenol, Miralax and senna-cot.  She will follow up for a postop check at 2 weeks with Dr. Martinez.    Ellie Mendoza MD on 1/6/2023 at 8:13 AM

## 2023-01-06 NOTE — PLAN OF CARE
Pt alert and oriented x 3; VSS overnight. On 1.5L of O2 while sleeping. Up with SBA. Pt c/o pain rating 5/10; managed well with tylenol. Porras and vaginal packing removed this morning. IV removed. Discharge pending for today, 1/6. Will continue to monitor and reassess.

## 2023-01-06 NOTE — PROGRESS NOTES
[facility] OB/GYN Department    Post-op Progress Note: POD #1    Name: Paz Workman  Date: 1/6/2023    Subjective:   Patient seen and examined.  No complaints.  Pain well controlled with tylenol.  Tolerating regular diet, no nausea or vomiting.  Ambulating without difficulty.  Vaginal pack and gaitan removed this morning.  Passing gas.      ROS:    General/Constitutional:  Denies chills or fever  Respiratory: Denies shortness of breath  Cardiovascular: Denies chest pain  Gastrointestinal:  +flatus, no nausea or vomiting  Genitourinary: Has not urinated since gaitan removed this am  Musculoskeletal: Denies peripheral edema      Objective:     Intake/Output Summary (Last 24 hours) at 1/6/2023 0817  Last data filed at 1/6/2023 0548  Gross per 24 hour   Intake 1700 ml   Output 2470 ml   Net -770 ml       Patient Vitals for the past 24 hrs:   BP Temp Temp src Pulse Resp SpO2   01/06/23 0717 119/62 -- -- -- 16 95 %   01/06/23 0326 120/55 98.1  F (36.7  C) Oral 75 16 97 %   01/05/23 2320 112/53 98.3  F (36.8  C) Oral 91 16 96 %   01/05/23 1936 125/74 98.5  F (36.9  C) Oral 109 16 96 %   01/05/23 1900 113/57 98.5  F (36.9  C) Oral 98 16 96 %   01/05/23 1600 126/69 99.2  F (37.3  C) Oral 85 16 96 %   01/05/23 1530 127/57 -- -- 85 -- 97 %   01/05/23 1500 120/66 -- -- 83 -- 97 %   01/05/23 1430 119/60 -- -- 93 -- 95 %   01/05/23 1415 -- -- -- -- -- 95 %   01/05/23 1400 107/57 -- -- -- 16 (!) 89 %   01/05/23 1345 112/65 -- -- 82 15 100 %   01/05/23 1330 110/62 -- -- 68 20 99 %   01/05/23 1315 109/58 -- -- 67 17 100 %   01/05/23 1300 101/59 -- -- 62 17 100 %   01/05/23 1245 109/61 -- -- 68 18 99 %   01/05/23 1230 102/56 -- -- 67 15 99 %   01/05/23 1215 101/57 -- -- 67 11 99 %   01/05/23 1212 -- -- -- 64 17 99 %   01/05/23 1200 99/56 -- -- 64 17 100 %   01/05/23 1145 95/52 -- -- 62 17 100 %   01/05/23 1135 91/50 -- -- 61 18 100 %   01/05/23 1130 (!) 89/51 -- -- 62 20 99 %   01/05/23 1125 90/51 -- -- 61 19 100 %    01/05/23 1120 90/51 -- -- 63 20 100 %   01/05/23 1115 90/51 -- -- 63 23 100 %   01/05/23 1110 90/51 -- -- 64 21 100 %   01/05/23 1100 (!) 86/50 -- -- 67 24 99 %   01/05/23 1055 91/55 -- -- 68 21 98 %   01/05/23 1050 (!) 72/48 97.6  F (36.4  C) Temporal 67 22 96 %   01/05/23 1045 (!) 72/48 -- -- 67 25 (!) 89 %   01/05/23 1042 (!) 72/46 -- -- 67 10 94 %   01/05/23 1034 96/55 96.8  F (36  C) Temporal 68 (!) 8 94 %       Recent Labs   Lab 01/05/23  0701   HGB 12.2       General appearance: well-hydrated, A&O x 3, no apparent distress  ENT: EOMI, sclera anicteric   Lungs: Equal expansion bilaterally, no accessory muscle use  Heart: No heaves or thrills. No peripheral varicosities  Constitutional: See vitals  Abdomen: Soft, non-distended, no rebound or rigidity   Neurologic: CN II-XII grossly intact, no lateralizing defects, no gross movement abnormalities  Extremities: no edema, no calf tenderness    Assessment and Plan:    78 year old  S/p vaginal hysterectomy, bilateral salpingo-oophorectomy, anterior repair  Doing well  Hemoglobin pending  Multiple discharge questions answered.  Reviewed precautions.  Patient states Senakot doesn't work well for her, but she eats two molasses cookies a day and drinks coffee and has been able to prevent constipation with this.  Advised of the importance of avoiding constipation.  Plan discharge today    Ellie Mendoza MD

## 2023-01-06 NOTE — PLAN OF CARE
Goal Outcome Evaluation:      Plan of Care Reviewed With: patient    Overall Patient Progress: improvingOverall Patient Progress: improving     Patient cleared for discharge. Instructions reviewed and questions answered. Patient discharged via wheelchair at 11:05 am.

## 2023-01-09 LAB — GLUCOSE BLDC GLUCOMTR-MCNC: 165 MG/DL (ref 70–99)

## 2023-01-11 ENCOUNTER — PATIENT OUTREACH (OUTPATIENT)
Dept: ONCOLOGY | Facility: CLINIC | Age: 78
End: 2023-01-11

## 2023-01-11 ENCOUNTER — OFFICE VISIT (OUTPATIENT)
Dept: OBGYN | Facility: CLINIC | Age: 78
End: 2023-01-11
Payer: COMMERCIAL

## 2023-01-11 VITALS
HEIGHT: 64 IN | DIASTOLIC BLOOD PRESSURE: 72 MMHG | TEMPERATURE: 98.2 F | SYSTOLIC BLOOD PRESSURE: 116 MMHG | HEART RATE: 87 BPM | RESPIRATION RATE: 16 BRPM | WEIGHT: 124 LBS | BODY MASS INDEX: 21.17 KG/M2

## 2023-01-11 DIAGNOSIS — C54.1 PRIMARY SEROUS ADENOCARCINOMA OF ENDOMETRIUM (H): Primary | ICD-10-CM

## 2023-01-11 DIAGNOSIS — Z09 POSTOP CHECK: Primary | ICD-10-CM

## 2023-01-11 LAB
PATH REPORT.COMMENTS IMP SPEC: ABNORMAL
PATH REPORT.COMMENTS IMP SPEC: YES
PATH REPORT.FINAL DX SPEC: ABNORMAL
PATH REPORT.GROSS SPEC: ABNORMAL
PATH REPORT.MICROSCOPIC SPEC OTHER STN: ABNORMAL
PATH REPORT.RELEVANT HX SPEC: ABNORMAL
PATHOLOGY SYNOPTIC REPORT: ABNORMAL
PHOTO IMAGE: ABNORMAL

## 2023-01-11 PROCEDURE — 99024 POSTOP FOLLOW-UP VISIT: CPT | Performed by: OBSTETRICS & GYNECOLOGY

## 2023-01-11 NOTE — PROGRESS NOTES
New Patient Hematology / Oncology Nurse Navigator Note     Referral Date: 1/11/23    Referring provider:   Ellie Mendoza MD   [address] [facility] [address]   Phone: [phone]   Fax: [phone]       Referring Clinic/Organization: [facility]     Referred to: GynOnc    Requested provider (if applicable): First available - did not specify     Evaluation for : Endometrial cancer, high-grade serous carcinoma. 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  -- See cancer staging and biomarker synoptic checklists below for additional details.  -- Myometrium with adenomyosis.  -- Cervix negative for dysplasia.  Tunnel clusters present.  -- Benign bilateral ovaries and fallopian tubes.      -- BOOKMARKED    Clinical Assessment / Barriers to Care (Per Nurse):  Pt lives in [facility]    [facility]     Records Needed:   N/A    Additional testing needed prior to consult:   N/A    Referral updates and Plan:   OUTGOING CALL to pt, no answer.     LVM introducing my role as nurse navigator with [employer] [facility] and that we have recd the referral for dx of Endometrial Cancer from Dr Mendoza    Explained to pt that he/she will receive a call from our scheduling intake team and provided our call-back number below if needed.      Blanche Owens, JEFFN, RN, PHN, OCN  Hematology/Oncology Nurse Navigator  [facility]  [phone]

## 2023-01-11 NOTE — NURSING NOTE
"Initial /72 (BP Location: Left arm, Patient Position: Chair, Cuff Size: Adult Regular)   Pulse 87   Temp 98.2  F (36.8  C) (Tympanic)   Resp 16   Ht 1.626 m (5' 4\")   Wt 56.2 kg (124 lb)   LMP  (LMP Unknown)   BMI 21.28 kg/m   Estimated body mass index is 21.28 kg/m  as calculated from the following:    Height as of this encounter: 1.626 m (5' 4\").    Weight as of this encounter: 56.2 kg (124 lb). .    Daisha Gregorio, SANDRA    "

## 2023-01-11 NOTE — PROGRESS NOTES
"  SUBJECTIVE:  Paz Workman  is here for a postop check.  She is status post vaginal hysterectomy, bilateral salpingo-oophorectomy and anterior repair performed on 1/5/22.  She has no complaints today.  This was a pessary check scheduled before her surgery and she didn't realize her postop check is in two weeks.        ROS: denies fevers, chills, nausea, vomiting or diarrhea.  Bleeding has stopped.      /72 (BP Location: Left arm, Patient Position: Chair, Cuff Size: Adult Regular)   Pulse 87   Temp 98.2  F (36.8  C) (Tympanic)   Resp 16   Ht 1.626 m (5' 4\")   Wt 56.2 kg (124 lb)   LMP  (LMP Unknown)   BMI 21.28 kg/m      EXAM:     General appearance: well-hydrated, A&O x 3, no apparent distress      IMPRESSION: No diagnosis found.  Postop vaginal hysterectomy with BSO and anterior repair    PLAN:  doing well.  Will plan exam next week.  RTC prn.      "

## 2023-01-13 LAB — INTERPRETATION: NORMAL

## 2023-01-18 ENCOUNTER — OFFICE VISIT (OUTPATIENT)
Dept: OBGYN | Facility: CLINIC | Age: 78
End: 2023-01-18
Payer: COMMERCIAL

## 2023-01-18 VITALS
HEIGHT: 64 IN | HEART RATE: 81 BPM | WEIGHT: 121.3 LBS | RESPIRATION RATE: 14 BRPM | DIASTOLIC BLOOD PRESSURE: 79 MMHG | BODY MASS INDEX: 20.71 KG/M2 | TEMPERATURE: 97.1 F | SYSTOLIC BLOOD PRESSURE: 157 MMHG

## 2023-01-18 DIAGNOSIS — C54.1 ENDOMETRIAL CARCINOMA (H): Primary | ICD-10-CM

## 2023-01-18 PROCEDURE — 99024 POSTOP FOLLOW-UP VISIT: CPT | Performed by: OBSTETRICS & GYNECOLOGY

## 2023-01-18 NOTE — NURSING NOTE
"Initial BP (!) 157/79 (BP Location: Left arm, Patient Position: Chair, Cuff Size: Adult Regular)   Pulse 81   Temp 97.1  F (36.2  C) (Tympanic)   Resp 14   Ht 1.626 m (5' 4\")   Wt 55 kg (121 lb 4.8 oz)   LMP  (LMP Unknown)   BMI 20.82 kg/m   Estimated body mass index is 20.82 kg/m  as calculated from the following:    Height as of this encounter: 1.626 m (5' 4\").    Weight as of this encounter: 55 kg (121 lb 4.8 oz). .    Daisha Gregorio, SANDRA    "

## 2023-01-18 NOTE — PROGRESS NOTES
"  SUBJECTIVE:  Paz Workman  is here for a postop check.  She is status post vaginal hysterectomy with bilateral salpingo-oophorectomy and anterior repair performed on 1/5/23.  She has no complaints today.  She is feeling good.  No bleeding or pain.        ROS: denies fevers, chills, nausea, vomiting or diarrhea.  Bleeding has stopped.      BP (!) 157/79 (BP Location: Left arm, Patient Position: Chair, Cuff Size: Adult Regular)   Pulse 81   Temp 97.1  F (36.2  C) (Tympanic)   Resp 14   Ht 1.626 m (5' 4\")   Wt 55 kg (121 lb 4.8 oz)   LMP  (LMP Unknown)   BMI 20.82 kg/m      EXAM:     General appearance: well-hydrated, A&O x 3, no apparent distress  Lungs: Equal expansion bilaterally, no accessory muscle use  Heart: No heaves or thrills. No peripheral varicosities  Extremities: neg edema  Neuro: CN II-XII grossly intact  Genitourinary:  External genitalia: no erythema, no lesions.   Vagina:  Incisions healing well.  No erythema, edema or drainage, the prior noted area of erythema, in the posterior upper vagina, from the pessary has decreased in size.  Previously was approximately 3 cm long.  Is not approximately 1 cm long.  Urethral meatus appropriate location without lesions or prolapse  Urethra: No masses, tenderness, or scarring  Bladder no fullness, masses, or tenderness.  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  -- See cancer staging and biomarker synoptic checklists below for additional details.  -- Myometrium with adenomyosis.  -- Cervix negative for dysplasia.  Tunnel clusters present.  -- Benign bilateral ovaries and fallopian tubes.                   IMPRESSION/PLAN:   Postop doing well.  Endometrial serous carcinoma.  Has an appointment with gyn-oncology " tomorrow.

## 2023-01-25 ENCOUNTER — ONCOLOGY VISIT (OUTPATIENT)
Dept: ONCOLOGY | Facility: HOSPITAL | Age: 78
End: 2023-01-25
Attending: OBSTETRICS & GYNECOLOGY
Payer: COMMERCIAL

## 2023-01-25 VITALS
BODY MASS INDEX: 20.94 KG/M2 | HEART RATE: 93 BPM | DIASTOLIC BLOOD PRESSURE: 74 MMHG | RESPIRATION RATE: 18 BRPM | WEIGHT: 122 LBS | OXYGEN SATURATION: 100 % | SYSTOLIC BLOOD PRESSURE: 154 MMHG | TEMPERATURE: 98.4 F

## 2023-01-25 DIAGNOSIS — C54.1 PRIMARY SEROUS ADENOCARCINOMA OF ENDOMETRIUM (H): ICD-10-CM

## 2023-01-25 PROCEDURE — 99205 OFFICE O/P NEW HI 60 MIN: CPT | Performed by: OBSTETRICS & GYNECOLOGY

## 2023-01-25 PROCEDURE — G0463 HOSPITAL OUTPT CLINIC VISIT: HCPCS | Performed by: OBSTETRICS & GYNECOLOGY

## 2023-01-25 RX ORDER — POLYETHYLENE GLYCOL 3350 17 G/17G
1 POWDER, FOR SOLUTION ORAL DAILY PRN
COMMUNITY
End: 2023-05-10

## 2023-01-25 ASSESSMENT — PAIN SCALES - GENERAL: PAINLEVEL: NO PAIN (0)

## 2023-01-25 NOTE — PROGRESS NOTES
Consult Notes on Referred Patient    Date: 2023       Dr. Ellie Mendoza MD  [address] [place] [address]       RE: Paz Workman  : 1945  KAL: 2023    Dear Dr. Ellie Mendoza:    I had the pleasure of seeing your patient Paz Workman here at the Gynecologic Cancer Clinic at the [facility] on 2023.  As you know she is a very pleasant 78 year old woman with a recent diagnosis of high grade serous endometrial cancer unstaged.  Given these findings she was subsequently sent to the Gynecologic Cancer Clinic for new patient consultation.    prior vaginal deliveries went through menopause in her early 50s.  Has never been home pressman therapy.  Never had any postmenopausal bleeding.  She had a personal history of right-sided breast cancer that was to be treated with double mastectomy neoadjuvant and adjuvant chemotherapy and radiation.  She has been disease-free since then.  She recently underwent a vaginal hysterectomy with bilateral salpingo-oophorectomy for uterine prolapse.  She has been using a pessary until then.  She also underwent anterior repair.  Preoperative work-up included a pelvic ultrasound which showed a thickened endometrial stripe of 12 mm again without any postmenopausal bleeding.  An endometrial biopsy had insufficient tissue.  On final pathology was found to have a noninvasive 8 mm high-grade serous carcinoma in her endometrium.  She has been recovering well since surgery she is eating and drinking well no nausea vomiting fever chills she had initially some vaginal spotting that has now resolved.  No B symptoms.      Past Medical History:  Hypertension.  Personal history of breast cancer at age 61 currently no evidence of disease.    Past Surgical History:    Past Surgical History:   Procedure Laterality Date     cataract surgey Bilateral 2021     COLPORRHAPHY ANTERIOR Bilateral 2023    Procedure:  anterior repair;  Surgeon: Ellie Mendoza MD;  Location: [facility] OR     LAPAROSCOPIC ASSISTED HYSTERECTOMY VAGINAL, BILATERAL SALPINGO-OOPHORECTOMY, COMBINED Bilateral 1/5/2023    Procedure: HYSTERECTOMY, VAGINAL, WITH SALPINGO-OOPHORECTOMY, Bilateral and;  Surgeon: Ellie Mendoza MD;  Location: [facility] OR         Health Maintenance:  Health Maintenance Due   Topic Date Due     DEXA  Never done     ADVANCE CARE PLANNING  Never done     HEPATITIS C SCREENING  Never done     DTAP/TDAP/TD IMMUNIZATION (1 - Tdap) Never done     LIPID  Never done     FALL RISK ASSESSMENT  Never done       No history of abnormal Pap smears.  Patient never had a colonoscopy.      Current Medications:     has a current medication list which includes the following prescription(s): acetaminophen, amlodipine, calcium-magnesium-zinc, cranberry, docusate sodium, multi-vitamins, polyethyl glycol-propyl glycol, polyethylene glycol, glucosamine hcl, and multiple vitamins-minerals.       Allergies:     Atorvastatin, Ibuprofen, and Lisinopril        Social History:     Social History     Tobacco Use     Smoking status: Never     Smokeless tobacco: Never   Substance Use Topics     Alcohol use: Yes     Comment: occ       History   Drug Use Unknown           Family History:   One of her daughters had colon cancer in her early 50s.  The other daughter had a cancer in her cheek and was thought to have primary peritoneal cancer however has not received chemotherapy and possibly be did not have a cancer diagnosis after all.    Physical Exam:     BP (!) 154/74   Pulse 93   Temp 98.4  F (36.9  C)   Resp 18   Wt 55.3 kg (122 lb)   LMP  (LMP Unknown)   SpO2 100%   BMI 20.94 kg/m    Body mass index is 20.94 kg/m .    General Appearance: healthy and alert, no distress     Musculoskeletal: extremities non tender and without edema    Skin: no lesions or rashes     Neurological: normal gait, no gross defects     Psychiatric: appropriate mood and  affect                               Hematological: normal cervical, supraclavicular and inguinal lymph nodes     Gastrointestinal:       abdomen soft, non-tender, non-distended, no organomegaly or masses    Genitourinary: External genitalia and urethral meatus appears normal.  Vagina is smooth without nodularity or masses, well-healing anterior repair.  Well-healing vaginal cuff intact..  Bimanual exam reveal no masses, nodularity or fullness.  Recto-vaginal exam confirms these findings.      Assessment:    Paz Workman is a 78 year old woman with a new diagnosis of high grade serous endometrial cancer unstaged.     A total of 60 minutes was spent with the patient, 40 minutes of which were spent in counseling the patient and/or treatment planning.      1. High grade serous endometrial cancer unstaged  2. Personal history of breast cancer at age 61    We discussed we will obtain a CT chest abdomen pelvis to evaluate for any additional local or distant disease.  We did discuss that typically we would consider a second operation for staging purposes with removal of pelvic parotic lymph nodes omental biopsy as well as washings.  We will discuss this in further detail with her after the result of the CT scan.  We will also have her see genetic counselor given that she has a personal history of breast cancer as well as now has a high-grade serous endometrial cancer.  The patient agrees with this plan she is very appreciative of her care all questions were answered.      Thank you for allowing us to participate in the care of your patient.         Sincerely,    Delmer Nicholson MD, MS    Department of Obstetrics and Gynecology   Division of Gynecologic Oncology   [facility]  Phone: [phone]    CC  Patient Care Team:  Jessica Shoemaker MD as PCP - General (Family Medicine)  Ellie Mendoza MD as Assigned OBGYN Provider  Delmer Nicholson MD as MD  (Gynecologic Oncology)  PERRY KENDRICK A

## 2023-01-25 NOTE — LETTER
2023         RE: Paz Workman  [address]   [facility] [place] [address]        Dear Colleague,    Thank you for referring your patient, Paz Workman, to the [facility]. Please see a copy of my visit note below.                            Consult Notes on Referred Patient    Date: 2023       Dr. Ellie Mendoza MD  [address] [place] [address]       RE: Paz Workman  : 1945  KAL: 2023    Dear Dr. Ellie Mendoza:    I had the pleasure of seeing your patient Paz Workman here at the Gynecologic Cancer Clinic at the [facility] on 2023.  As you know she is a very pleasant 78 year old woman with a recent diagnosis of high grade serous endometrial cancer unstaged.  Given these findings she was subsequently sent to the Gynecologic Cancer Clinic for new patient consultation.    prior vaginal deliveries went through menopause in her early 50s.  Has never been home pressman therapy.  Never had any postmenopausal bleeding.  She had a personal history of right-sided breast cancer that was to be treated with double mastectomy neoadjuvant and adjuvant chemotherapy and radiation.  She has been disease-free since then.  She recently underwent a vaginal hysterectomy with bilateral salpingo-oophorectomy for uterine prolapse.  She has been using a pessary until then.  She also underwent anterior repair.  Preoperative work-up included a pelvic ultrasound which showed a thickened endometrial stripe of 12 mm again without any postmenopausal bleeding.  An endometrial biopsy had insufficient tissue.  On final pathology was found to have a noninvasive 8 mm high-grade serous carcinoma in her endometrium.  She has been recovering well since surgery she is eating and drinking well no nausea vomiting fever chills she had initially some vaginal spotting that has now resolved.  No B symptoms.      Past Medical History:  Hypertension.   Personal history of breast cancer at age 61 currently no evidence of disease.    Past Surgical History:    Past Surgical History:   Procedure Laterality Date     cataract surgey Bilateral 07/01/2021     COLPORRHAPHY ANTERIOR Bilateral 1/5/2023    Procedure: anterior repair;  Surgeon: Ellie Mendoza MD;  Location: [facility] OR     LAPAROSCOPIC ASSISTED HYSTERECTOMY VAGINAL, BILATERAL SALPINGO-OOPHORECTOMY, COMBINED Bilateral 1/5/2023    Procedure: HYSTERECTOMY, VAGINAL, WITH SALPINGO-OOPHORECTOMY, Bilateral and;  Surgeon: Ellie Mendoza MD;  Location: [facility] OR         Health Maintenance:  Health Maintenance Due   Topic Date Due     DEXA  Never done     ADVANCE CARE PLANNING  Never done     HEPATITIS C SCREENING  Never done     DTAP/TDAP/TD IMMUNIZATION (1 - Tdap) Never done     LIPID  Never done     FALL RISK ASSESSMENT  Never done       No history of abnormal Pap smears.  Patient never had a colonoscopy.      Current Medications:     has a current medication list which includes the following prescription(s): acetaminophen, amlodipine, calcium-magnesium-zinc, cranberry, docusate sodium, multi-vitamins, polyethyl glycol-propyl glycol, polyethylene glycol, glucosamine hcl, and multiple vitamins-minerals.       Allergies:     Atorvastatin, Ibuprofen, and Lisinopril        Social History:     Social History     Tobacco Use     Smoking status: Never     Smokeless tobacco: Never   Substance Use Topics     Alcohol use: Yes     Comment: occ       History   Drug Use Unknown           Family History:   One of her daughters had colon cancer in her early 50s.  The other daughter had a cancer in her cheek and was thought to have primary peritoneal cancer however has not received chemotherapy and possibly be did not have a cancer diagnosis after all.    Physical Exam:     BP (!) 154/74   Pulse 93   Temp 98.4  F (36.9  C)   Resp 18   Wt 55.3 kg (122 lb)   LMP  (LMP Unknown)   SpO2 100%   BMI 20.94 kg/m    Body mass  index is 20.94 kg/m .    General Appearance: healthy and alert, no distress     Musculoskeletal: extremities non tender and without edema    Skin: no lesions or rashes     Neurological: normal gait, no gross defects     Psychiatric: appropriate mood and affect                               Hematological: normal cervical, supraclavicular and inguinal lymph nodes     Gastrointestinal:       abdomen soft, non-tender, non-distended, no organomegaly or masses    Genitourinary: External genitalia and urethral meatus appears normal.  Vagina is smooth without nodularity or masses, well-healing anterior repair.  Well-healing vaginal cuff intact..  Bimanual exam reveal no masses, nodularity or fullness.  Recto-vaginal exam confirms these findings.      Assessment:    Paz Workman is a 78 year old woman with a new diagnosis of high grade serous endometrial cancer unstaged.     A total of 60 minutes was spent with the patient, 40 minutes of which were spent in counseling the patient and/or treatment planning.      1. High grade serous endometrial cancer unstaged  2. Personal history of breast cancer at age 61    We discussed we will obtain a CT chest abdomen pelvis to evaluate for any additional local or distant disease.  We did discuss that typically we would consider a second operation for staging purposes with removal of pelvic parotic lymph nodes omental biopsy as well as washings.  We will discuss this in further detail with her after the result of the CT scan.  We will also have her see genetic counselor given that she has a personal history of breast cancer as well as now has a high-grade serous endometrial cancer.  The patient agrees with this plan she is very appreciative of her care all questions were answered.      Thank you for allowing us to participate in the care of your patient.         Sincerely,    Delmer Nicholson MD, MS    Department of Obstetrics and Gynecology   Division of  Gynecologic Oncology   [facility]  Phone: [phone]    CC  Patient Care Team:  Jessica Shoemaker MD as PCP - General (Family Medicine)  Ellie Kendrick MD as Assigned OBGYN Provider  Delmer Nicholson MD as MD (Gynecologic Oncology)  ELLIE KENDRICK        Again, thank you for allowing me to participate in the care of your patient.        Sincerely,        Delmer Nicholson MD

## 2023-01-25 NOTE — NURSING NOTE
"Oncology Rooming Note    January 25, 2023 12:44 PM   Paz Workman is a 78 year old female who presents for:    Chief Complaint   Patient presents with     Oncology Clinic Visit     Gyn Onc Consult     Initial Vitals: BP (!) 154/74   Pulse 93   Temp 98.4  F (36.9  C)   Resp 18   Wt 55.3 kg (122 lb)   LMP  (LMP Unknown)   SpO2 100%   BMI 20.94 kg/m   Estimated body mass index is 20.94 kg/m  as calculated from the following:    Height as of 1/18/23: 1.626 m (5' 4\").    Weight as of this encounter: 55.3 kg (122 lb). Body surface area is 1.58 meters squared.  No Pain (0) Comment: Data Unavailable   No LMP recorded (lmp unknown). Patient has had a hysterectomy.  Allergies reviewed: Yes  Medications reviewed: Yes    Medications: Medication refills not needed today.  Pharmacy name entered into [employer]: [facility] - [place], [place] - [address]    Clinical concerns: Denies pain, denies vaginal drainage/bleeding. Still mild constipation which is relieved well with Mirilax and/or stool softeners.  Dr. Nicholson was notified.      Loli Jha RN              "

## 2023-01-27 ENCOUNTER — HOSPITAL ENCOUNTER (OUTPATIENT)
Dept: CT IMAGING | Facility: CLINIC | Age: 78
Discharge: HOME | End: 2023-01-27
Attending: OBSTETRICS & GYNECOLOGY | Admitting: OBSTETRICS & GYNECOLOGY
Payer: COMMERCIAL

## 2023-01-27 DIAGNOSIS — C54.1 PRIMARY SEROUS ADENOCARCINOMA OF ENDOMETRIUM (H): ICD-10-CM

## 2023-01-27 LAB
CREAT BLD-MCNC: 1.1 MG/DL (ref 0.5–1)
GFR SERPL CREATININE-BSD FRML MDRD: 51 ML/MIN/1.73M2

## 2023-01-27 PROCEDURE — 250N000009 HC RX 250: Performed by: RADIOLOGY

## 2023-01-27 PROCEDURE — 82565 ASSAY OF CREATININE: CPT

## 2023-01-27 PROCEDURE — 250N000011 HC RX IP 250 OP 636: Performed by: RADIOLOGY

## 2023-01-27 PROCEDURE — 74177 CT ABD & PELVIS W/CONTRAST: CPT

## 2023-01-27 RX ORDER — IOPAMIDOL 755 MG/ML
54 INJECTION, SOLUTION INTRAVASCULAR ONCE
Status: COMPLETED | OUTPATIENT
Start: 2023-01-27 | End: 2023-01-27

## 2023-01-27 RX ADMIN — IOPAMIDOL 54 ML: 755 INJECTION, SOLUTION INTRAVENOUS at 08:29

## 2023-01-27 RX ADMIN — SODIUM CHLORIDE 55 ML: 9 INJECTION, SOLUTION INTRAVENOUS at 08:29

## 2023-02-08 ENCOUNTER — VIRTUAL VISIT (OUTPATIENT)
Dept: ONCOLOGY | Facility: HOSPITAL | Age: 78
End: 2023-02-08
Attending: OBSTETRICS & GYNECOLOGY
Payer: COMMERCIAL

## 2023-02-08 DIAGNOSIS — C54.1 PRIMARY SEROUS ADENOCARCINOMA OF ENDOMETRIUM (H): Primary | ICD-10-CM

## 2023-02-08 PROCEDURE — 99215 OFFICE O/P EST HI 40 MIN: CPT | Mod: VID | Performed by: OBSTETRICS & GYNECOLOGY

## 2023-02-08 PROCEDURE — G0463 HOSPITAL OUTPT CLINIC VISIT: HCPCS | Mod: PN,GT | Performed by: OBSTETRICS & GYNECOLOGY

## 2023-02-08 NOTE — PROGRESS NOTES
Video-Visit Details    Type of service:  Video Visit    40 minutes    Originating Location (pt. Location): Home  Distant Location (provider location):  On-site    Mode of Communication:  Video Conference via [facility]                Follow Up Notes on Referred Patient    Date: 2023       Dr. Silver referring provider defined for this encounter.       RE: Paz Workman  : 1945  KAL: 2023    Dear Dr. Silver ref. 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  No new symptoms since last time I have seen her.      Past Medical History:  Hypertension.  Personal history of breast cancer at age 61 currently no evidence of disease.    Past Surgical History:    Past Surgical History:   Procedure Laterality Date     cataract surgey Bilateral 2021     COLPORRHAPHY ANTERIOR Bilateral 2023    Procedure: anterior repair;  Surgeon: Ellie Mendoza MD;  Location: [facility]     LAPAROSCOPIC ASSISTED HYSTERECTOMY VAGINAL, BILATERAL SALPINGO-OOPHORECTOMY, COMBINED Bilateral 2023    Procedure: HYSTERECTOMY, VAGINAL, WITH SALPINGO-OOPHORECTOMY, Bilateral and;  Surgeon: Ellie Mendoza MD;  Location: [facility]         Health Maintenance Due   Topic Date Due     DEXA  Never done     ADVANCE CARE PLANNING  Never done     HEPATITIS C SCREENING  Never done     DTAP/TDAP/TD IMMUNIZATION (1 - Tdap) Never done     LIPID  Never done     FALL RISK ASSESSMENT  Never done       Current Medications:     Current Outpatient Medications   Medication Sig Dispense Refill     acetaminophen (TYLENOL) 325 MG tablet Take 3 tablets (975 mg) by mouth every 6 hours as needed for mild pain 50 tablet 0     amLODIPine (NORVASC) 5 MG tablet Take 7.5 mg by mouth       CALCIUM-MAGNESIUM-ZINC PO Take 1 tablet by mouth       Cranberry 600 MG TABS        Docusate Sodium (STOOL SOFTENER LAXATIVE PO)        Glucosamine HCl 1500 MG TABS Take 3,000 mg  by mouth (Patient not taking: Reported on 1/25/2023)       Multiple Vitamin (MULTI-VITAMINS) TABS Take 1 tablet by mouth       Multiple Vitamins-Minerals (EYE HEALTH PO)  (Patient not taking: Reported on 1/25/2023)       Polyethyl Glycol-Propyl Glycol (SYSTANE OP)        polyethylene glycol (MIRALAX) 17 g packet Take 1 packet by mouth daily as needed for constipation           Allergies:        Allergies   Allergen Reactions     Atorvastatin Muscle Pain (Myalgia)     Ibuprofen Swelling     Lisinopril Other (See Comments)        Social History:     Social History     Tobacco Use     Smoking status: Never     Smokeless tobacco: Never   Substance Use Topics     Alcohol use: Yes     Comment: occ       History   Drug Use Unknown         Family History:   One of her daughters had colon cancer in her early 50s.  The other daughter had a cancer in her cheek and was thought to have primary peritoneal cancer however has not received chemotherapy and possibly be did not have a cancer diagnosis after all.    Physical Exam:     LMP  (LMP Unknown)   There is no height or weight on file to calculate BMI.    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     A total of 40 minutes was spent with the patient, 30 minutes of which were spent in counseling the patient and/or treatment planning.        1. 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 Personal history of breast cancer at age 61  3. Cyst in pancreatic tail     We discussed the finding of the CT scan which does not show any evidence of static disease.  Of note she does have small 7mm cyst in the tail of her her pancreas, we would need to follow this on a future scan.  We did discuss I would recommend to proceed with minimal invasive staging  procedure with pelvic and periodic lymphadenectomy washings as well as omentectomy.  We did discuss the pros and cons of this.  The patient states she would not want any chemotherapy under any circumstances regardless.  She is also quite concerned about the 25% risk of lymphedema as she had problems with that with her lymph node removal in her axilla for breast cancer.  She at this point does not want to proceed with surgery for staging.  She also does not want any adjuvant chemotherapy at this point.  We did discuss to repeat a CT chest abdomen pelvis in 3 months and reassess.  Which she feels comfortable with.  We will follow-up in 3 months after the CT scan. The patient agrees with this plan she is very appreciative of her care all questions were answered.          Delmer Nicholson MD, MS    Department of Obstetrics and Gynecology   Division of Gynecologic Oncology   [facility]  Phone: [phone]      CC  Patient Care Team:  Jessica Shoemaker MD as PCP - General (Family Medicine)  Ellie Mendoza MD as Assigned OBGYN Provider  Delmer Nicholson MD as MD (Gynecologic Oncology)  Delmer Nicholson MD as Assigned Cancer Care Provider

## 2023-02-08 NOTE — NURSING NOTE
Is the patient currently in the state of [place]? YES    Visit mode:VIDEO    If the visit is dropped, the patient can be reconnected by: VIDEO VISIT: Send to e-mail at: [email or web address]    Will anyone else be joining the visit? NO      How would you like to obtain your AVS? MyChart    Are changes needed to the allergy or medication list? NO    Comments or concerns related to today's visit: Patient declined individual allergy and medication review by support staff because nothing has changed since last review. PO

## 2023-03-16 ENCOUNTER — HOSPITAL ENCOUNTER (EMERGENCY)
Facility: CLINIC | Age: 78
Discharge: HOME | End: 2023-03-16
Payer: COMMERCIAL

## 2023-03-16 VITALS
OXYGEN SATURATION: 97 % | RESPIRATION RATE: 22 BRPM | SYSTOLIC BLOOD PRESSURE: 133 MMHG | DIASTOLIC BLOOD PRESSURE: 70 MMHG | TEMPERATURE: 97.6 F | HEART RATE: 65 BPM

## 2023-03-16 DIAGNOSIS — R23.2 FACIAL FLUSHING: ICD-10-CM

## 2023-03-16 DIAGNOSIS — N89.8 VAGINAL ITCHING: ICD-10-CM

## 2023-03-16 LAB
ALBUMIN UR-MCNC: NEGATIVE MG/DL
APPEARANCE UR: CLEAR
BACTERIA #/AREA URNS HPF: ABNORMAL /HPF
BILIRUB UR QL STRIP: NEGATIVE
CLUE CELLS: ABNORMAL
COLOR UR AUTO: ABNORMAL
GLUCOSE UR STRIP-MCNC: NEGATIVE MG/DL
HGB UR QL STRIP: NEGATIVE
KETONES UR STRIP-MCNC: NEGATIVE MG/DL
LEUKOCYTE ESTERASE UR QL STRIP: NEGATIVE
NITRATE UR QL: NEGATIVE
PH UR STRIP: 7 [PH] (ref 5–7)
RBC URINE: <1 /HPF
SP GR UR STRIP: 1 (ref 1–1.03)
TRICHOMONAS, WET PREP: ABNORMAL
UROBILINOGEN UR STRIP-MCNC: NORMAL MG/DL
WBC URINE: <1 /HPF
WBC'S/HIGH POWER FIELD, WET PREP: ABNORMAL
YEAST, WET PREP: ABNORMAL

## 2023-03-16 PROCEDURE — 81001 URINALYSIS AUTO W/SCOPE: CPT | Performed by: NURSE PRACTITIONER

## 2023-03-16 PROCEDURE — 87086 URINE CULTURE/COLONY COUNT: CPT | Performed by: NURSE PRACTITIONER

## 2023-03-16 PROCEDURE — 99214 OFFICE O/P EST MOD 30 MIN: CPT | Performed by: NURSE PRACTITIONER

## 2023-03-16 PROCEDURE — G0463 HOSPITAL OUTPT CLINIC VISIT: HCPCS | Performed by: NURSE PRACTITIONER

## 2023-03-16 PROCEDURE — 87210 SMEAR WET MOUNT SALINE/INK: CPT | Performed by: NURSE PRACTITIONER

## 2023-03-16 RX ORDER — CEPHALEXIN 500 MG/1
500 CAPSULE ORAL 2 TIMES DAILY
COMMUNITY
Start: 2023-03-10 | End: 2023-05-10

## 2023-03-16 RX ORDER — BETAMETHASONE DIPROPIONATE 0.5 MG/G
OINTMENT, AUGMENTED TOPICAL 2 TIMES DAILY
Qty: 50 G | Refills: 0 | Status: SHIPPED | OUTPATIENT
Start: 2023-03-16 | End: 2023-05-10

## 2023-03-16 ASSESSMENT — ACTIVITIES OF DAILY LIVING (ADL): ADLS_ACUITY_SCORE: 35

## 2023-03-16 NOTE — ED PROVIDER NOTES
History     Chief Complaint   Patient presents with     UTI     Uti concern is on antibiotics  last  Friday not working.      HPI  Paz Workman is a 78 year old female who presents to the urgent care for evaluation of vaginal itching. Patient reports calling into PCP 6 days ago reporting that vaginal itching is a UTI complaint of hers. She was started on Keflex, urine at that time with trace LE, slightly elevated WBC and white clumps. She reports no chance in vaginal itching. No fevers, abdominal pain, flank pain, pelvic pain, hematuria, abnormal vaginal discharge, dyrusia and bleeding. S/p hysterectomy 3 months ago. She also reports facial flushing over the last week or two.     Allergies:  Allergies   Allergen Reactions     Atorvastatin Muscle Pain (Myalgia)     Ibuprofen Swelling     Lisinopril Other (See Comments)     Problem List:    Patient Active Problem List    Diagnosis Date Noted     Endometrial carcinoma (H) 01/18/2023     Priority: Medium     Benign essential hypertension 02/08/2019     Priority: Medium     Malignant neoplasm of upper-outer quadrant of breast in female, estrogen receptor positive (H) 02/08/2019     Priority: Medium     S/p bilateral mastectomy, chemo and XRT; on Tamoxifen       Vaginal wall prolapse 11/01/2018     Priority: Medium     Ring with support pessary        Past Medical History:    History reviewed. No pertinent past medical history.    Past Surgical History:    Past Surgical History:   Procedure Laterality Date     cataract surgey Bilateral 07/01/2021     COLPORRHAPHY ANTERIOR Bilateral 1/5/2023    Procedure: anterior repair;  Surgeon: Ellie Mendoza MD;  Location: [facility]     LAPAROSCOPIC ASSISTED HYSTERECTOMY VAGINAL, BILATERAL SALPINGO-OOPHORECTOMY, COMBINED Bilateral 1/5/2023    Procedure: HYSTERECTOMY, VAGINAL, WITH SALPINGO-OOPHORECTOMY, Bilateral and;  Surgeon: Ellie Mendoza MD;  Location: [facility]     Family History:    History reviewed. No pertinent  family history.    Social History:  Marital Status:   [2]  Social History     Tobacco Use     Smoking status: Never     Smokeless tobacco: Never   Vaping Use     Vaping Use: Never used   Substance Use Topics     Alcohol use: Yes     Comment: occ     Drug use: Never      Medications:    augmented betamethasone dipropionate (DIPROLENE-AF) 0.05 % external ointment  acetaminophen (TYLENOL) 325 MG tablet  amLODIPine (NORVASC) 5 MG tablet  CALCIUM-MAGNESIUM-ZINC PO  cephALEXin (KEFLEX) 500 MG capsule  Cranberry 600 MG TABS  Docusate Sodium (STOOL SOFTENER LAXATIVE PO)  Glucosamine HCl 1500 MG TABS  Multiple Vitamin (MULTI-VITAMINS) TABS  Multiple Vitamins-Minerals (EYE HEALTH PO)  Polyethyl Glycol-Propyl Glycol (SYSTANE OP)  polyethylene glycol (MIRALAX) 17 g packet      Review of Systems   Genitourinary:        Vaginal itching   All other systems reviewed and are negative.    Physical Exam   BP: 133/70  Pulse: 65  Temp: 97.6  F (36.4  C)  Resp: 22  SpO2: 97 %    Physical Exam  Constitutional:       General: She is not in acute distress.     Appearance: Normal appearance.   Eyes:      Conjunctiva/sclera: Conjunctivae normal.      Pupils: Pupils are equal, round, and reactive to light.   Cardiovascular:      Rate and Rhythm: Normal rate.   Pulmonary:      Effort: Pulmonary effort is normal.   Genitourinary:     Vagina: Prolapsed vaginal walls (mild) present.      Comments: Pale, dry skin. No discharge.   Musculoskeletal:         General: Normal range of motion.      Cervical back: Normal range of motion.   Skin:     General: Skin is warm.      Capillary Refill: Capillary refill takes less than 2 seconds.   Neurological:      General: No focal deficit present.      Mental Status: She is alert.       ED Course             Procedures    Results for orders placed or performed during the hospital encounter of 03/16/23 (from the past 24 hour(s))   UA with Microscopic reflex to Culture    Specimen: Urine, Clean Catch    Result Value Ref Range    Color Urine Straw Colorless, Straw, Light Yellow, Yellow    Appearance Urine Clear Clear    Glucose Urine Negative Negative mg/dL    Bilirubin Urine Negative Negative    Ketones Urine Negative Negative mg/dL    Specific Gravity Urine 1.003 1.003 - 1.035    Blood Urine Negative Negative    pH Urine 7.0 5.0 - 7.0    Protein Albumin Urine Negative Negative mg/dL    Urobilinogen Urine Normal Normal, 2.0 mg/dL    Nitrite Urine Negative Negative    Leukocyte Esterase Urine Negative Negative    Bacteria Urine Few (A) None Seen /HPF    RBC Urine <1 <=2 /HPF    WBC Urine <1 <=5 /HPF    Narrative    Urine Culture not indicated   Wet prep    Specimen: Vagina; Swab   Result Value Ref Range    Trichomonas Absent Absent    Yeast Absent Absent    Clue Cells Absent Absent    WBCs/high power field 2+ (A) None     Medications - No data to display    Assessments & Plan (with Medical Decision Making)   Paz Workman is a 78 year old female who presents to the urgent care for evaluation of vaginal itching. Patient reports calling into PCP 6 days ago reporting that vaginal itching is a UTI complaint of hers. She was started on Keflex, urine at that time with trace LE, slightly elevated WBC and white clumps. She reports no chance in vaginal itching. S/p hysterectomy 3 months ago. She also reports facial flushing over the last week or two. Vital signs normal, physical exam as above. Urinalysis without sign of infection, patient insists on urinary culture to confirm lack of bacterial infection and this is pending. Wet prep negative. Small vaginal prolapse but remains above the vaginal introitus. Discussed atrophic vaginitis and will try Diprolene and follow up with gynecology. Return precautions reviewed, all questions answered. Patient is agreeable to plan of care and discharged in no acute distress.     I have reviewed the nursing notes.    I have reviewed the findings, diagnosis, plan and need for follow up  with the patient.    Discharge Medication List as of 3/16/2023  1:14 PM      START taking these medications    Details   augmented betamethasone dipropionate (DIPROLENE-AF) 0.05 % external ointment Apply topically 2 times dailyDisp-50 g, [id number]           Final diagnoses:   Vaginal itching   Facial flushing     3/16/2023   [facility] EMERGENCY DEPT     Kiesha Roberts, APRN CNP  03/16/23 [id number]

## 2023-03-18 LAB — BACTERIA UR CULT: NORMAL

## 2023-04-03 ENCOUNTER — TELEPHONE (OUTPATIENT)
Dept: OBGYN | Facility: CLINIC | Age: 78
End: 2023-04-03
Payer: COMMERCIAL

## 2023-04-11 ENCOUNTER — OFFICE VISIT (OUTPATIENT)
Dept: OBGYN | Facility: CLINIC | Age: 78
End: 2023-04-11
Payer: COMMERCIAL

## 2023-04-11 VITALS
SYSTOLIC BLOOD PRESSURE: 135 MMHG | DIASTOLIC BLOOD PRESSURE: 69 MMHG | WEIGHT: 124.6 LBS | RESPIRATION RATE: 16 BRPM | BODY MASS INDEX: 21.27 KG/M2 | HEIGHT: 64 IN | TEMPERATURE: 98.1 F | HEART RATE: 79 BPM

## 2023-04-11 DIAGNOSIS — B37.2 CANDIDAL INTERTRIGO: Primary | ICD-10-CM

## 2023-04-11 LAB
CLUE CELLS: ABNORMAL
TRICHOMONAS, WET PREP: ABNORMAL
WBC'S/HIGH POWER FIELD, WET PREP: ABNORMAL
YEAST, WET PREP: ABNORMAL

## 2023-04-11 PROCEDURE — 87210 SMEAR WET MOUNT SALINE/INK: CPT | Performed by: OBSTETRICS & GYNECOLOGY

## 2023-04-11 PROCEDURE — 99213 OFFICE O/P EST LOW 20 MIN: CPT | Performed by: OBSTETRICS & GYNECOLOGY

## 2023-04-11 RX ORDER — CLOTRIMAZOLE AND BETAMETHASONE DIPROPIONATE 10; .64 MG/G; MG/G
CREAM TOPICAL
Qty: 15 G | Refills: 1 | Status: SHIPPED | OUTPATIENT
Start: 2023-04-11 | End: 2023-05-10

## 2023-04-11 NOTE — RESULT ENCOUNTER NOTE
The attached results were normal. Please follow any recommendations discussed in clinic.    Ellie Mendoza MD          4/11/2023 4:04 PM

## 2023-04-11 NOTE — NURSING NOTE
"Initial /69 (BP Location: Left arm, Patient Position: Chair, Cuff Size: Adult Regular)   Pulse 79   Temp 98.1  F (36.7  C) (Tympanic)   Resp 16   Ht 1.626 m (5' 4\")   Wt 56.5 kg (124 lb 9.6 oz)   LMP  (LMP Unknown)   BMI 21.39 kg/m   Estimated body mass index is 21.39 kg/m  as calculated from the following:    Height as of this encounter: 1.626 m (5' 4\").    Weight as of this encounter: 56.5 kg (124 lb 9.6 oz). .    Daisha Gregorio, SANDRA    "

## 2023-04-12 ENCOUNTER — PATIENT OUTREACH (OUTPATIENT)
Dept: ONCOLOGY | Facility: CLINIC | Age: 78
End: 2023-04-12
Payer: COMMERCIAL

## 2023-04-12 NOTE — TELEPHONE ENCOUNTER
Patient reached out and had questions about the following:  ~Needed follow up  ~What kind of cancer   ~what would surgery have looked like  ~ Chemotherapy options       RN addressed all patients questions and concerns addressed to the best of her ability     Patient encouraged to write down all questions for her upcoming visit     Patient states her daughters will be with her for that visit and that will be helpful     Patient appreciative of RN time and call     Vicky Hoyt RN

## 2023-04-13 NOTE — PROGRESS NOTES
"    Chief Complaint   Patient presents with     Vaginal Problem     irritation         HPI:  Paz Workman, 78 year old,  presents with complaints of itching in the upper, outer vulvar area.  She was given a steroid cream, which helped, but she was worried about it as the pharmacist told her to \"be careful\" with it.  So she stopped that.  She was going to use Vagisil, but thought she should see us first.        History reviewed. No pertinent past medical history.  Past Surgical History:   Procedure Laterality Date     cataract surgey Bilateral 2021     COLPORRHAPHY ANTERIOR Bilateral 2023    Procedure: anterior repair;  Surgeon: Ellie Mendoza MD;  Location: [facility]     LAPAROSCOPIC ASSISTED HYSTERECTOMY VAGINAL, BILATERAL SALPINGO-OOPHORECTOMY, COMBINED Bilateral 2023    Procedure: HYSTERECTOMY, VAGINAL, WITH SALPINGO-OOPHORECTOMY, Bilateral and;  Surgeon: Ellie Mendoza MD;  Location: [facility]     Social History     Socioeconomic History     Marital status:      Spouse name: Not on file     Number of children: Not on file     Years of education: Not on file     Highest education level: Not on file   Occupational History     Not on file   Tobacco Use     Smoking status: Never     Smokeless tobacco: Never   Vaping Use     Vaping status: Never Used   Substance and Sexual Activity     Alcohol use: Yes     Comment: occ     Drug use: Never     Sexual activity: Not Currently     Birth control/protection: Post-menopausal   Other Topics Concern     Parent/sibling w/ CABG, MI or angioplasty before 65F 55M? Not Asked   Social History Narrative     Not on file     Social Determinants of Health     Financial Resource Strain: Not on file   Food Insecurity: Not on file   Transportation Needs: Not on file   Physical Activity: Not on file   Stress: Not on file   Social Connections: Not on file   Intimate Partner Violence: Not on file   Housing Stability: Not on file     History reviewed. " "No pertinent family history.     Current Outpatient Medications   Medication Sig Dispense Refill     amLODIPine (NORVASC) 5 MG tablet Take 7.5 mg by mouth       CALCIUM-MAGNESIUM-ZINC PO Take 1 tablet by mouth       clotrimazole-betamethasone (LOTRISONE) 1-0.05 % external cream Apply a small amount (pea sized) to the area of itching in the labia, twice daily for 10 days. 15 g 1     Docusate Sodium (STOOL SOFTENER LAXATIVE PO)        Glucosamine HCl 1500 MG TABS Take 3,000 mg by mouth       Multiple Vitamin (MULTI-VITAMINS) TABS Take 1 tablet by mouth       Multiple Vitamins-Minerals (EYE HEALTH PO)        Polyethyl Glycol-Propyl Glycol (SYSTANE OP)        acetaminophen (TYLENOL) 325 MG tablet Take 3 tablets (975 mg) by mouth every 6 hours as needed for mild pain (Patient not taking: Reported on 4/11/2023) 50 tablet 0     augmented betamethasone dipropionate (DIPROLENE-AF) 0.05 % external ointment Apply topically 2 times daily 50 g 0     cephALEXin (KEFLEX) 500 MG capsule Take 500 mg by mouth 2 times daily (Patient not taking: Reported on 4/11/2023)       Cranberry 600 MG TABS  (Patient not taking: Reported on 4/11/2023)       polyethylene glycol (MIRALAX) 17 g packet Take 1 packet by mouth daily as needed for constipation (Patient not taking: Reported on 4/11/2023)          Allergies:     Allergies   Allergen Reactions     Atorvastatin Muscle Pain (Myalgia)     Ibuprofen Swelling     Lisinopril Other (See Comments)        ROS:  See HPI      Physical Exam:  /69 (BP Location: Left arm, Patient Position: Chair, Cuff Size: Adult Regular)   Pulse 79   Temp 98.1  F (36.7  C) (Tympanic)   Resp 16   Ht 1.626 m (5' 4\")   Wt 56.5 kg (124 lb 9.6 oz)   LMP  (LMP Unknown)   BMI 21.39 kg/m       Appearance: well developed, well nourished, no acute distress  Head Exam normocephalic, no lesions or deformities  Abdomen: soft, non-tender, no masses, no organomegaly, no hernia,  Skin: no lesions  Extremities: no " edema  Psych: orientated x3    Genitourinary Exam  Vulva: minimal erythema at the upper labial folds.  Urethral meatus: normal size and location, no lesions or discharge  Urethra: no tenderness or masses  Bladder: no fullness or tenderness  Vagina: no cystocele and rectocele, atrophic, no discharge    Assessment/Plan:  Encounter Diagnosis   Name Primary?     Candidal intertrigo Yes       Will treat with topical antifungal/steroid combination.  Lotrisone prescribed and advised using a small amount twice a day for 10 days.          Ellie Mendoza MD on 4/13/2023 at 12:29 PM

## 2023-05-02 ENCOUNTER — HOSPITAL ENCOUNTER (OUTPATIENT)
Dept: CT IMAGING | Facility: CLINIC | Age: 78
Discharge: HOME | End: 2023-05-02
Attending: OBSTETRICS & GYNECOLOGY | Admitting: OBSTETRICS & GYNECOLOGY
Payer: COMMERCIAL

## 2023-05-02 DIAGNOSIS — C54.1 PRIMARY SEROUS ADENOCARCINOMA OF ENDOMETRIUM (H): ICD-10-CM

## 2023-05-02 LAB
CREAT BLD-MCNC: 1.2 MG/DL (ref 0.5–1)
GFR SERPL CREATININE-BSD FRML MDRD: 46 ML/MIN/1.73M2

## 2023-05-02 PROCEDURE — 74177 CT ABD & PELVIS W/CONTRAST: CPT

## 2023-05-02 PROCEDURE — 250N000011 HC RX IP 250 OP 636: Performed by: RADIOLOGY

## 2023-05-02 PROCEDURE — 82565 ASSAY OF CREATININE: CPT

## 2023-05-02 PROCEDURE — 250N000009 HC RX 250: Performed by: RADIOLOGY

## 2023-05-02 RX ORDER — IOPAMIDOL 755 MG/ML
55 INJECTION, SOLUTION INTRAVASCULAR ONCE
Status: COMPLETED | OUTPATIENT
Start: 2023-05-02 | End: 2023-05-02

## 2023-05-02 RX ADMIN — SODIUM CHLORIDE 55 ML: 9 INJECTION, SOLUTION INTRAVENOUS at 09:20

## 2023-05-02 RX ADMIN — IOPAMIDOL 55 ML: 755 INJECTION, SOLUTION INTRAVENOUS at 09:20

## 2023-05-10 ENCOUNTER — ONCOLOGY VISIT (OUTPATIENT)
Dept: ONCOLOGY | Facility: HOSPITAL | Age: 78
End: 2023-05-10
Payer: COMMERCIAL

## 2023-05-10 VITALS
RESPIRATION RATE: 16 BRPM | TEMPERATURE: 98.7 F | DIASTOLIC BLOOD PRESSURE: 79 MMHG | OXYGEN SATURATION: 98 % | SYSTOLIC BLOOD PRESSURE: 139 MMHG | HEART RATE: 89 BPM | BODY MASS INDEX: 21.08 KG/M2 | WEIGHT: 122.8 LBS

## 2023-05-10 DIAGNOSIS — C54.1 ENDOMETRIAL CANCER (H): Primary | ICD-10-CM

## 2023-05-10 PROCEDURE — 99215 OFFICE O/P EST HI 40 MIN: CPT | Performed by: OBSTETRICS & GYNECOLOGY

## 2023-05-10 PROCEDURE — G0463 HOSPITAL OUTPT CLINIC VISIT: HCPCS | Performed by: OBSTETRICS & GYNECOLOGY

## 2023-05-10 ASSESSMENT — PAIN SCALES - GENERAL: PAINLEVEL: MILD PAIN (2)

## 2023-05-10 NOTE — LETTER
5/10/2023         RE: Paz Workman  [address]   [place] [address]        Dear Colleague,    Thank you for referring your patient, Paz Workman, to the [facility]. Please see a copy of my visit note below.                Follow Up Notes on Referred Patient    Date: 5/10/2023       Dr. Silver referring provider defined for this encounter.       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  She is feeling well overall.  She is eating and drinking well has no nausea vomiting fever chills no vaginal bleeding or discharge.  No B symptoms.        Past Medical History:    No past medical history on file.      Past Surgical History:    Past Surgical History:   Procedure Laterality Date     cataract surgey Bilateral 2021     COLPORRHAPHY ANTERIOR Bilateral 2023    Procedure: anterior repair;  Surgeon: Ellie Mendoza MD;  Location: [facility]     LAPAROSCOPIC ASSISTED HYSTERECTOMY VAGINAL, BILATERAL SALPINGO-OOPHORECTOMY, COMBINED Bilateral 2023    Procedure: HYSTERECTOMY, VAGINAL, WITH SALPINGO-OOPHORECTOMY, Bilateral and;  Surgeon: Ellie Mendoza MD;  Location: [facility]         Health Maintenance Due   Topic Date Due     DEXA  Never done     ADVANCE CARE PLANNING  Never done     HEPATITIS C SCREENING  Never done     DTAP/TDAP/TD IMMUNIZATION (1 - Tdap) Never done     LIPID  Never done     COVID-19 Vaccine (5 - Pfizer series) 2023       Current Medications:     Current Outpatient Medications   Medication Sig Dispense Refill     acetaminophen (TYLENOL) 325 MG tablet Take 3 tablets (975 mg) by mouth every 6 hours as needed for mild pain 50 tablet 0     amLODIPine (NORVASC) 5 MG tablet Take 7.5 mg by mouth       CALCIUM-MAGNESIUM-ZINC PO Take 1 tablet by mouth       Docusate Sodium (STOOL SOFTENER LAXATIVE PO)        Glucosamine  HCl 1500 MG TABS Take 3,000 mg by mouth       Multiple Vitamin (MULTI-VITAMINS) TABS Take 1 tablet by mouth       Multiple Vitamins-Minerals (EYE HEALTH PO)        Polyethyl Glycol-Propyl Glycol (SYSTANE OP)        Cranberry 600 MG TABS  (Patient not taking: Reported on 4/11/2023)           Allergies:        Allergies   Allergen Reactions     Atorvastatin Muscle Pain (Myalgia)     Ibuprofen Swelling     Lisinopril Other (See Comments)        Social History:     Social History     Tobacco Use     Smoking status: Never     Smokeless tobacco: Never   Vaping Use     Vaping status: Never Used   Substance Use Topics     Alcohol use: Yes     Comment: occ       History   Drug Use Unknown           Physical Exam:     /79   Pulse 89   Temp 98.7  F (37.1  C)   Resp 16   Wt 55.7 kg (122 lb 12.8 oz)   LMP  (LMP Unknown)   SpO2 98%   BMI 21.08 kg/m    Body mass index is 21.08 kg/m .    General Appearance: healthy and alert, no distress     Musculoskeletal: extremities non tender and without edema    Neurological: normal gait, no gross defects     Psychiatric: appropriate mood and affect                               Hematological: normal cervical, supraclavicular and inguinal lymph nodes     Gastrointestinal:       abdomen soft, non-tender, non-distended, no organomegaly or masses, well-healed incisions.    Genitourinary: External genitalia and urethral meatus appears normal.  Vagina is smooth without nodularity or masses.  Well-healed and intact vaginal cuff.  Bimanual exam reveal no masses, nodularity or fullness.  Recto-vaginal exam confirms these findings.      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      A total of 40 minutes was spent with the patient, 30 minutes of which were spent in counseling the patient and/or treatment planning.        1. 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 Personal  history of breast cancer at age 61  3. Cyst in pancreatic tail     We discussed the finding of the CT scan which does not show any evidence of static disease.  Of note she does have small 8mm cyst in the tail of her her pancreas, which is stable.  Discussed again typical management. She at this point does not want to proceed with surgery for staging.  She also does not want any adjuvant chemotherapy at this point.  We did discuss to proceed with surveillance visit in 3 months and reassess.  We did discuss surveillance every 3 months for the first 2 years.  Followed by every 6 months for the next 3 years followed by yearly. The patient agrees with this plan she is very appreciative of her care all questions were answered.           Delmer Nicholson MD, MS    Department of Obstetrics and Gynecology   Division of Gynecologic Oncology   [facility]  Phone: [phone]      CC  Patient Care Team:  Jessica Shoemaker MD as PCP - General (Family Medicine)  Ellie Mendoza MD as Assigned OBGYN Provider  Delmer Nicholson MD as MD (Gynecologic Oncology)  Delmer Nicholson MD as Assigned Cancer Care Provider  Hannah Cummings NP as Assigned PCP        Again, thank you for allowing me to participate in the care of your patient.        Sincerely,        Delmer Nicholson MD

## 2023-05-10 NOTE — NURSING NOTE
"Oncology Rooming Note    May 10, 2023 4:26 PM   Paz Workman is a 78 year old female who presents for:    Chief Complaint   Patient presents with     Oncology Clinic Visit     Gyn Onc follow up     Initial Vitals: /79   Pulse 89   Temp 98.7  F (37.1  C)   Resp 16   Wt 55.7 kg (122 lb 12.8 oz)   LMP  (LMP Unknown)   SpO2 98%   BMI 21.08 kg/m   Estimated body mass index is 21.08 kg/m  as calculated from the following:    Height as of 4/11/23: 1.626 m (5' 4\").    Weight as of this encounter: 55.7 kg (122 lb 12.8 oz). Body surface area is 1.59 meters squared.  Mild Pain (2) Comment: Data Unavailable   No LMP recorded (lmp unknown). Patient has had a hysterectomy.  Allergies reviewed: Yes  Medications reviewed: Yes    Medications: Medication refills not needed today.  Pharmacy name entered into [email or web address]: [facility] - [place], [place] - [address]    Clinical concerns: Feeling well physically, anxious about today's apt.  Dr. Nicholson was NOT notified.      Loli Jha RN              "

## 2023-06-06 NOTE — PROGRESS NOTES
Follow Up Notes on Referred Patient    Date: 5/10/2023       Dr. Silver referring provider defined for this encounter.       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  She is feeling well overall.  She is eating and drinking well has no nausea vomiting fever chills no vaginal bleeding or discharge.  No B symptoms.        Past Medical History:    No past medical history on file.      Past Surgical History:    Past Surgical History:   Procedure Laterality Date     cataract surgey Bilateral 2021     COLPORRHAPHY ANTERIOR Bilateral 2023    Procedure: anterior repair;  Surgeon: Ellie Mendoza MD;  Location: [facility]     LAPAROSCOPIC ASSISTED HYSTERECTOMY VAGINAL, BILATERAL SALPINGO-OOPHORECTOMY, COMBINED Bilateral 2023    Procedure: HYSTERECTOMY, VAGINAL, WITH SALPINGO-OOPHORECTOMY, Bilateral and;  Surgeon: Ellie Mendoza MD;  Location: [facility]         Health Maintenance Due   Topic Date Due     DEXA  Never done     ADVANCE CARE PLANNING  Never done     HEPATITIS C SCREENING  Never done     DTAP/TDAP/TD IMMUNIZATION (1 - Tdap) Never done     LIPID  Never done     COVID-19 Vaccine (5 - Pfizer series) 2023       Current Medications:     Current Outpatient Medications   Medication Sig Dispense Refill     acetaminophen (TYLENOL) 325 MG tablet Take 3 tablets (975 mg) by mouth every 6 hours as needed for mild pain 50 tablet 0     amLODIPine (NORVASC) 5 MG tablet Take 7.5 mg by mouth       CALCIUM-MAGNESIUM-ZINC PO Take 1 tablet by mouth       Docusate Sodium (STOOL SOFTENER LAXATIVE PO)        Glucosamine HCl 1500 MG TABS Take 3,000 mg by mouth       Multiple Vitamin (MULTI-VITAMINS) TABS Take 1 tablet by mouth       Multiple Vitamins-Minerals (EYE HEALTH PO)        Polyethyl Glycol-Propyl Glycol (SYSTANE OP)        Cranberry 600 MG TABS  (Patient not taking:  Reported on 4/11/2023)           Allergies:        Allergies   Allergen Reactions     Atorvastatin Muscle Pain (Myalgia)     Ibuprofen Swelling     Lisinopril Other (See Comments)        Social History:     Social History     Tobacco Use     Smoking status: Never     Smokeless tobacco: Never   Vaping Use     Vaping status: Never Used   Substance Use Topics     Alcohol use: Yes     Comment: occ       History   Drug Use Unknown           Physical Exam:     /79   Pulse 89   Temp 98.7  F (37.1  C)   Resp 16   Wt 55.7 kg (122 lb 12.8 oz)   LMP  (LMP Unknown)   SpO2 98%   BMI 21.08 kg/m    Body mass index is 21.08 kg/m .    General Appearance: healthy and alert, no distress     Musculoskeletal: extremities non tender and without edema    Neurological: normal gait, no gross defects     Psychiatric: appropriate mood and affect                               Hematological: normal cervical, supraclavicular and inguinal lymph nodes     Gastrointestinal:       abdomen soft, non-tender, non-distended, no organomegaly or masses, well-healed incisions.    Genitourinary: External genitalia and urethral meatus appears normal.  Vagina is smooth without nodularity or masses.  Well-healed and intact vaginal cuff.  Bimanual exam reveal no masses, nodularity or fullness.  Recto-vaginal exam confirms these findings.      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      A total of 40 minutes was spent with the patient, 30 minutes of which were spent in counseling the patient and/or treatment planning.        1. 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 Personal history of breast cancer at age 61  3. Cyst in pancreatic tail     We discussed the finding of the CT scan which does not show any evidence of static disease.  Of note she does have small 8mm cyst in the tail of her her pancreas, which is stable.  Discussed  again typical management. She at this point does not want to proceed with surgery for staging.  She also does not want any adjuvant chemotherapy at this point.  We did discuss to proceed with surveillance visit in 3 months and reassess.  We did discuss surveillance every 3 months for the first 2 years.  Followed by every 6 months for the next 3 years followed by yearly. The patient agrees with this plan she is very appreciative of her care all questions were answered.           Delmer Nicholson MD, MS    Department of Obstetrics and Gynecology   Division of Gynecologic Oncology   [facility]  Phone: [phone]      CC  Patient Care Team:  Jessica Shoemaker MD as PCP - General (Family Medicine)  Ellie Mendoza MD as Assigned OBGYN Provider  Delmer Nicholson MD as MD (Gynecologic Oncology)  Delmer Nicholson MD as Assigned Cancer Care Provider  Hannah Cummings NP as Assigned PCP

## 2023-09-27 ENCOUNTER — ONCOLOGY VISIT (OUTPATIENT)
Dept: ONCOLOGY | Facility: HOSPITAL | Age: 78
End: 2023-09-27
Attending: OBSTETRICS & GYNECOLOGY
Payer: COMMERCIAL

## 2023-09-27 VITALS
HEART RATE: 75 BPM | WEIGHT: 125.2 LBS | BODY MASS INDEX: 21.49 KG/M2 | SYSTOLIC BLOOD PRESSURE: 159 MMHG | RESPIRATION RATE: 16 BRPM | OXYGEN SATURATION: 94 % | DIASTOLIC BLOOD PRESSURE: 97 MMHG

## 2023-09-27 DIAGNOSIS — C54.1 ENDOMETRIAL CANCER (H): Primary | ICD-10-CM

## 2023-09-27 PROCEDURE — G0463 HOSPITAL OUTPT CLINIC VISIT: HCPCS | Performed by: OBSTETRICS & GYNECOLOGY

## 2023-09-27 PROCEDURE — 99215 OFFICE O/P EST HI 40 MIN: CPT | Performed by: OBSTETRICS & GYNECOLOGY

## 2023-09-27 ASSESSMENT — PAIN SCALES - GENERAL: PAINLEVEL: NO PAIN (0)

## 2023-09-27 NOTE — NURSING NOTE
"Oncology Rooming Note    September 27, 2023 11:33 AM   Paz Workman is a 78 year old female who presents for:    No chief complaint on file.    Initial Vitals: LMP  (LMP Unknown)  Estimated body mass index is 21.08 kg/m  as calculated from the following:    Height as of 4/11/23: 1.626 m (5' 4\").    Weight as of 5/10/23: 55.7 kg (122 lb 12.8 oz). There is no height or weight on file to calculate BSA.  Data Unavailable Comment: Data Unavailable   No LMP recorded (lmp unknown). Patient has had a hysterectomy.  Allergies reviewed: Yes  Medications reviewed: Yes    Medications: Medication refills not needed today.  Pharmacy name entered into [facility]: [facility] - [place], [place] - [address]    Clinical concerns: No new concerns, feeling well.   Dr. Nicholson was NOT notified.      Loli Jha RN              "

## 2023-09-27 NOTE — PROGRESS NOTES
Follow Up Notes on Referred Patient    Date: 2023       Dr. Silver referring provider defined for this encounter.       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  She is feeling well overall.  She is eating and drinking well has no nausea vomiting fever chills no vaginal bleeding or discharge.  No B symptoms.       Past Medical History:  Hypertension.  Personal history of breast cancer at age 61 currently no evidence of disease.       Past Surgical History:    Past Surgical History:   Procedure Laterality Date    cataract surgey Bilateral 2021    COLPORRHAPHY ANTERIOR Bilateral 2023    Procedure: anterior repair;  Surgeon: Ellie Mendoza MD;  Location: [facility]    LAPAROSCOPIC ASSISTED HYSTERECTOMY VAGINAL, BILATERAL SALPINGO-OOPHORECTOMY, COMBINED Bilateral 2023    Procedure: HYSTERECTOMY, VAGINAL, WITH SALPINGO-OOPHORECTOMY, Bilateral and;  Surgeon: Ellie Mendoza MD;  Location: [facility]         Health Maintenance Due   Topic Date Due    DEXA  Never done    ADVANCE CARE PLANNING  Never done    HEPATITIS C SCREENING  Never done    DTAP/TDAP/TD IMMUNIZATION (1 - Tdap) Never done    LIPID  Never done    COVID-19 Vaccine (5 - Pfizer series) 2023    INFLUENZA VACCINE (1) Never done       Current Medications:     Current Outpatient Medications   Medication Sig Dispense Refill    acetaminophen (TYLENOL) 325 MG tablet Take 3 tablets (975 mg) by mouth every 6 hours as needed for mild pain 50 tablet 0    amLODIPine (NORVASC) 5 MG tablet Take 7.5 mg by mouth      CALCIUM-MAGNESIUM-ZINC PO Take 1 tablet by mouth      Cranberry 600 MG TABS Uses whole cranberries in smoothies      Docusate Sodium (STOOL SOFTENER LAXATIVE PO)       Glucosamine HCl 1500 MG TABS Take 3,000 mg by mouth      Multiple Vitamin (MULTI-VITAMINS) TABS Take 1 tablet by mouth       Polyethyl Glycol-Propyl Glycol (SYSTANE OP)            Allergies:        Allergies   Allergen Reactions    Atorvastatin Muscle Pain (Myalgia)    Ibuprofen Swelling    Lisinopril Other (See Comments)        Social History:     Social History     Tobacco Use    Smoking status: Never    Smokeless tobacco: Never   Substance Use Topics    Alcohol use: Yes     Comment: occ       History   Drug Use Unknown         Family History:   One of her daughters had colon cancer in her early 50s.  The other daughter had a cancer in her cheek and was thought to have primary peritoneal cancer however has not received chemotherapy and possibly be did not have a cancer diagnosis after all.       Physical Exam:     BP (!) 159/97   Pulse 75   Resp 16   Wt 56.8 kg (125 lb 3.2 oz)   LMP  (LMP Unknown)   SpO2 94%   BMI 21.49 kg/m    Body mass index is 21.49 kg/m .    General Appearance:  healthy and alert, no distress     Musculoskeletal:         extremities non tender and without edema     Neurological:   normal gait, no gross defects                Psychiatric:      appropriate mood and affect                               Hematological:            normal cervical, supraclavicular and inguinal lymph nodes                Gastrointestinal:          abdomen soft, non-tender, non-distended, no organomegaly or masses, well-healed incisions.     Genitourinary: External genitalia and urethral meatus appears normal.  Vagina is smooth without nodularity or masses.  Well-healed and intact vaginal cuff.  Bimanual exam reveal no masses, nodularity or fullness.  Recto-vaginal exam confirms these findings.        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      A total of 40 minutes was spent with the patient, 30 minutes of which were spent in counseling the patient and/or treatment planning.        1. Unstaged none invasive high grade serous endometrial  cancer  2. Personal history of breast cancer at age 61  3. Cyst in pancreatic tail     We discussed the finding of the CT scan which does not show any evidence of static disease.  Of note she does have small 8mm cyst in the tail of her her pancreas, which is stable.  Discussed again typical management. She at this point does not want to proceed with surgery for staging.  She also does not want any adjuvant chemotherapy at this point.  We did discuss to proceed with surveillance visit in 3 months and reassess.  We did discuss surveillance every 3 months for the first 2 years.  Followed by every 6 months for the next 3 years followed by yearly. The patient agrees with this plan she is very appreciative of her care all questions were answered.           Delmer Nicholson MD, MS    Department of Obstetrics and Gynecology   Division of Gynecologic Oncology   [facility]  Phone: [phone]      CC  Patient Care Team:  Jessica Shoemaker MD as PCP - General (Family Medicine)  Ellie Mendoza MD as Assigned OBGYN Provider  Delmer Nicholson MD as MD (Gynecologic Oncology)  Delmer Nicholson MD as Assigned Cancer Care Provider

## 2023-09-27 NOTE — LETTER
2023         RE: Paz Workman  [address]   [place] [id number]        Dear Colleague,    Thank you for referring your patient, Paz Workman, to the [facility]. Please see a copy of my visit note below.                Follow Up Notes on Referred Patient    Date: 2023       Dr. Silver referring provider defined for this encounter.       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  She is feeling well overall.  She is eating and drinking well has no nausea vomiting fever chills no vaginal bleeding or discharge.  No B symptoms.       Past Medical History:  Hypertension.  Personal history of breast cancer at age 61 currently no evidence of disease.       Past Surgical History:    Past Surgical History:   Procedure Laterality Date     cataract surgey Bilateral 2021     COLPORRHAPHY ANTERIOR Bilateral 2023    Procedure: anterior repair;  Surgeon: Ellie Mendoza MD;  Location: [facility]     LAPAROSCOPIC ASSISTED HYSTERECTOMY VAGINAL, BILATERAL SALPINGO-OOPHORECTOMY, COMBINED Bilateral 2023    Procedure: HYSTERECTOMY, VAGINAL, WITH SALPINGO-OOPHORECTOMY, Bilateral and;  Surgeon: Ellie Mendoza MD;  Location: [facility]         Health Maintenance Due   Topic Date Due     DEXA  Never done     ADVANCE CARE PLANNING  Never done     HEPATITIS C SCREENING  Never done     DTAP/TDAP/TD IMMUNIZATION (1 - Tdap) Never done     LIPID  Never done     COVID-19 Vaccine (5 - Pfizer series) 2023     INFLUENZA VACCINE (1) Never done       Current Medications:     Current Outpatient Medications   Medication Sig Dispense Refill     acetaminophen (TYLENOL) 325 MG tablet Take 3 tablets (975 mg) by mouth every 6 hours as needed for mild pain 50 tablet 0     amLODIPine (NORVASC) 5 MG tablet Take 7.5 mg by mouth       CALCIUM-MAGNESIUM-ZINC  PO Take 1 tablet by mouth       Cranberry 600 MG TABS Uses whole cranberries in smoothies       Docusate Sodium (STOOL SOFTENER LAXATIVE PO)        Glucosamine HCl 1500 MG TABS Take 3,000 mg by mouth       Multiple Vitamin (MULTI-VITAMINS) TABS Take 1 tablet by mouth       Polyethyl Glycol-Propyl Glycol (SYSTANE OP)            Allergies:        Allergies   Allergen Reactions     Atorvastatin Muscle Pain (Myalgia)     Ibuprofen Swelling     Lisinopril Other (See Comments)        Social History:     Social History     Tobacco Use     Smoking status: Never     Smokeless tobacco: Never   Substance Use Topics     Alcohol use: Yes     Comment: occ       History   Drug Use Unknown         Family History:   One of her daughters had colon cancer in her early 50s.  The other daughter had a cancer in her cheek and was thought to have primary peritoneal cancer however has not received chemotherapy and possibly be did not have a cancer diagnosis after all.       Physical Exam:     BP (!) 159/97   Pulse 75   Resp 16   Wt 56.8 kg (125 lb 3.2 oz)   LMP  (LMP Unknown)   SpO2 94%   BMI 21.49 kg/m    Body mass index is 21.49 kg/m .    General Appearance:  healthy and alert, no distress     Musculoskeletal:         extremities non tender and without edema     Neurological:   normal gait, no gross defects                Psychiatric:      appropriate mood and affect                               Hematological:            normal cervical, supraclavicular and inguinal lymph nodes                Gastrointestinal:          abdomen soft, non-tender, non-distended, no organomegaly or masses, well-healed incisions.     Genitourinary: External genitalia and urethral meatus appears normal.  Vagina is smooth without nodularity or masses.  Well-healed and intact vaginal cuff.  Bimanual exam reveal no masses, nodularity or fullness.  Recto-vaginal exam confirms these findings.        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      A total of 40 minutes was spent with the patient, 30 minutes of which were spent in counseling the patient and/or treatment planning.        1. 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 Personal history of breast cancer at age 61  3. Cyst in pancreatic tail     We discussed the finding of the CT scan which does not show any evidence of static disease.  Of note she does have small 8mm cyst in the tail of her her pancreas, which is stable.  Discussed again typical management. She at this point does not want to proceed with surgery for staging.  She also does not want any adjuvant chemotherapy at this point.  We did discuss to proceed with surveillance visit in 3 months and reassess.  We did discuss surveillance every 3 months for the first 2 years.  Followed by every 6 months for the next 3 years followed by yearly. The patient agrees with this plan she is very appreciative of her care all questions were answered.           Delmer Nicholson MD, MS    Department of Obstetrics and Gynecology   Division of Gynecologic Oncology   [facility]  Phone: [phone]      CC  Patient Care Team:  Jessica Shoemaker MD as PCP - General (Family Medicine)  Ellie Mendoza MD as Assigned OBGYN Provider  Delmer Nicholson MD as MD (Gynecologic Oncology)  Delmer Nicholson MD as Assigned Cancer Care Provider        Again, thank you for allowing me to participate in the care of your patient.        Sincerely,        Delmer Nicholson MD

## 2023-12-14 ENCOUNTER — OFFICE VISIT (OUTPATIENT)
Dept: OBGYN | Facility: CLINIC | Age: 78
End: 2023-12-14
Payer: COMMERCIAL

## 2023-12-14 VITALS
BODY MASS INDEX: 21.92 KG/M2 | TEMPERATURE: 97.5 F | DIASTOLIC BLOOD PRESSURE: 75 MMHG | RESPIRATION RATE: 16 BRPM | HEIGHT: 64 IN | WEIGHT: 128.4 LBS | HEART RATE: 68 BPM | SYSTOLIC BLOOD PRESSURE: 151 MMHG

## 2023-12-14 DIAGNOSIS — N81.11 CYSTOCELE, MIDLINE: ICD-10-CM

## 2023-12-14 DIAGNOSIS — R33.9 INCOMPLETE EMPTYING OF BLADDER: Primary | ICD-10-CM

## 2023-12-14 LAB
ALBUMIN UR-MCNC: NEGATIVE MG/DL
APPEARANCE UR: CLEAR
BACTERIA #/AREA URNS HPF: ABNORMAL /HPF
BILIRUB UR QL STRIP: NEGATIVE
COLOR UR AUTO: YELLOW
GLUCOSE UR STRIP-MCNC: NEGATIVE MG/DL
HGB UR QL STRIP: NEGATIVE
KETONES UR STRIP-MCNC: NEGATIVE MG/DL
LEUKOCYTE ESTERASE UR QL STRIP: ABNORMAL
NITRATE UR QL: NEGATIVE
PH UR STRIP: 6.5 [PH] (ref 5–7)
RBC #/AREA URNS AUTO: ABNORMAL /HPF
SP GR UR STRIP: <=1.005 (ref 1–1.03)
SQUAMOUS #/AREA URNS AUTO: ABNORMAL /LPF
UROBILINOGEN UR STRIP-ACNC: 0.2 E.U./DL
WBC #/AREA URNS AUTO: ABNORMAL /HPF

## 2023-12-14 PROCEDURE — 87088 URINE BACTERIA CULTURE: CPT | Performed by: OBSTETRICS & GYNECOLOGY

## 2023-12-14 PROCEDURE — 57160 INSERT PESSARY/OTHER DEVICE: CPT | Performed by: OBSTETRICS & GYNECOLOGY

## 2023-12-14 PROCEDURE — 87086 URINE CULTURE/COLONY COUNT: CPT | Performed by: OBSTETRICS & GYNECOLOGY

## 2023-12-14 PROCEDURE — A4561 PESSARY RUBBER, ANY TYPE: HCPCS | Performed by: OBSTETRICS & GYNECOLOGY

## 2023-12-14 PROCEDURE — 81001 URINALYSIS AUTO W/SCOPE: CPT | Performed by: OBSTETRICS & GYNECOLOGY

## 2023-12-14 NOTE — PROGRESS NOTES
Chief Complaint   Patient presents with    Consult     Prolapse bladder- having trouble emptying bladder         HPI:  Paz Workman, 78 year old,  presents with complaints of prolapse  S/p vaginal hysterectomy, bilateral salpingo-oophorectomy, anterior repair on 2023.  She feels a protrusion in the vagina and has had difficulty emptying her bladder.  She watches the clock and sometimes it takes 12 hours before she has to urinate again.  She empties OK in the morning.    History reviewed. No pertinent past medical history.  Past Surgical History:   Procedure Laterality Date    cataract surgey Bilateral 2021    COLPORRHAPHY ANTERIOR Bilateral 2023    Procedure: anterior repair;  Surgeon: Ellie Mendoza MD;  Location: [facility]    LAPAROSCOPIC ASSISTED HYSTERECTOMY VAGINAL, BILATERAL SALPINGO-OOPHORECTOMY, COMBINED Bilateral 2023    Procedure: HYSTERECTOMY, VAGINAL, WITH SALPINGO-OOPHORECTOMY, Bilateral and;  Surgeon: Ellie Mendoza MD;  Location: [facility]     Social History     Socioeconomic History    Marital status:      Spouse name: Not on file    Number of children: Not on file    Years of education: Not on file    Highest education level: Not on file   Occupational History    Not on file   Tobacco Use    Smoking status: Never    Smokeless tobacco: Never   Vaping Use    Vaping Use: Never used   Substance and Sexual Activity    Alcohol use: Yes     Comment: occ    Drug use: Never    Sexual activity: Not Currently     Birth control/protection: Post-menopausal   Other Topics Concern    Parent/sibling w/ CABG, MI or angioplasty before 65F 55M? Not Asked   Social History Narrative    Not on file     Social Determinants of Health     Financial Resource Strain: Not on file   Food Insecurity: Not on file   Transportation Needs: Not on file   Physical Activity: Not on file   Stress: Not on file   Social Connections: Not on file   Interpersonal Safety: Not on file   Housing  "Stability: Not on file     History reviewed. No pertinent family history.     Current Outpatient Medications   Medication Sig Dispense Refill    amLODIPine (NORVASC) 5 MG tablet Take 7.5 mg by mouth      CALCIUM-MAGNESIUM-ZINC PO Take 1 tablet by mouth      Docusate Sodium (STOOL SOFTENER LAXATIVE PO)       Glucosamine HCl 1500 MG TABS Take 3,000 mg by mouth      Multiple Vitamin (MULTI-VITAMINS) TABS Take 1 tablet by mouth      Polyethyl Glycol-Propyl Glycol (SYSTANE OP)       acetaminophen (TYLENOL) 325 MG tablet Take 3 tablets (975 mg) by mouth every 6 hours as needed for mild pain (Patient not taking: Reported on 12/14/2023) 50 tablet 0    Cranberry 600 MG TABS Uses whole cranberries in smoothies (Patient not taking: Reported on 12/14/2023)          Allergies:     Allergies   Allergen Reactions    Atorvastatin Muscle Pain (Myalgia)    Ibuprofen Swelling    Lisinopril Other (See Comments)        ROS:  See HPI      Physical Exam:  BP (!) 151/75 (BP Location: Left arm, Patient Position: Chair, Cuff Size: Adult Regular)   Pulse 68   Temp 97.5  F (36.4  C) (Tympanic)   Resp 16   Ht 1.626 m (5' 4\")   Wt 58.2 kg (128 lb 6.4 oz)   LMP  (LMP Unknown)   BMI 22.04 kg/m       Appearance: well developed, well nourished, no acute distress  Head Exam normocephalic, no lesions or deformities  Abdomen: soft, non-tender, no masses, no organomegaly, no hernia,  Skin: no lesions  Extremities: no edema  Psych: orientated x3    Genitourinary Exam  Vulva: normal, no lesions  Urethral meatus: normal size and location, no lesions or discharge  Urethra: no tenderness or masses  Bladder: no fullness or tenderness  Vagina: grade 3 cystocele and no rectocele  Cervix: surgically absent  Uterus: surgically absent  Adnexa: no palpable masses bilaterally    Procedure: Pessary Fitting  Indication:    Encounter Diagnoses   Name Primary?    Incomplete emptying of bladder Yes    Cystocele, midline            The risks and benefits were " discussed.  The patient was placed in the dorsal-lithotomy position.  Exam revealed the above findings.  A size 2 1/2 donut pessary was initially tried and was too large..  She was allowed to ambulate and void. The size 2 donut fell out.   A size 2 1/4 in donut pessaries was also tried today. She did well with the 2 1/4 in donut pessary and was discharged with one.  She is to return in 6 months for recheck.  She was advised to return sooner if she is having any discomfort, bleeding or concerns.     Assessment/Plan:  Encounter Diagnoses   Name Primary?    Incomplete emptying of bladder Yes    Cystocele, midline        We discussed options of splinting, no treatment, pessaries or surgery with mesh.  She has failed one anterior colporrhaphy.  She does not want another surgery and opted to try a pessary today.  She was able to void with the pessary in.  She was fitted for a 2 1/4 in donut and did well with it.  Recheck in 6 months if she is doing well.  Return sooner if any discomfort or bleeding.          Ellie eMndoza MD on 12/14/2023 at 3:35 PM

## 2023-12-14 NOTE — NURSING NOTE
"Initial BP (!) 151/75 (BP Location: Left arm, Patient Position: Chair, Cuff Size: Adult Regular)   Pulse 68   Temp 97.5  F (36.4  C) (Tympanic)   Resp 16   Ht 1.626 m (5' 4\")   Wt 58.2 kg (128 lb 6.4 oz)   LMP  (LMP Unknown)   BMI 22.04 kg/m   Estimated body mass index is 22.04 kg/m  as calculated from the following:    Height as of this encounter: 1.626 m (5' 4\").    Weight as of this encounter: 58.2 kg (128 lb 6.4 oz). .    Daisha Gregorio, SANDRA    "

## 2023-12-15 DIAGNOSIS — N30.00 ACUTE CYSTITIS WITHOUT HEMATURIA: Primary | ICD-10-CM

## 2023-12-15 LAB — BACTERIA UR CULT: ABNORMAL

## 2023-12-15 RX ORDER — AMOXICILLIN 875 MG
875 TABLET ORAL 2 TIMES DAILY
Qty: 6 TABLET | Refills: 0 | Status: SHIPPED | OUTPATIENT
Start: 2023-12-15 | End: 2023-12-18

## 2023-12-18 ENCOUNTER — TELEPHONE (OUTPATIENT)
Dept: OBGYN | Facility: CLINIC | Age: 78
End: 2023-12-18
Payer: COMMERCIAL

## 2023-12-18 NOTE — TELEPHONE ENCOUNTER
[facility] Call Center    Phone Message    May a detailed message be left on voicemail: yes     Reason for Call: Other: Patient had an appointment last week for a pessary but the pessary keeps falling out and she is having to push it back in multiple times a day. Writer did schedule next appointment but that is jagjit. Please call her back to reschedule. She stated there is lot of pressure also. Thank you      Action Taken: Other: obgyn    Travel Screening: Not Applicable

## 2023-12-21 ENCOUNTER — OFFICE VISIT (OUTPATIENT)
Dept: OBGYN | Facility: CLINIC | Age: 78
End: 2023-12-21
Payer: COMMERCIAL

## 2023-12-21 VITALS
TEMPERATURE: 97.8 F | DIASTOLIC BLOOD PRESSURE: 81 MMHG | SYSTOLIC BLOOD PRESSURE: 136 MMHG | WEIGHT: 128 LBS | BODY MASS INDEX: 21.97 KG/M2 | HEART RATE: 78 BPM

## 2023-12-21 DIAGNOSIS — Z46.89 FITTING AND ADJUSTMENT OF PESSARY: Primary | ICD-10-CM

## 2023-12-21 PROCEDURE — 57160 INSERT PESSARY/OTHER DEVICE: CPT | Performed by: OBSTETRICS & GYNECOLOGY

## 2023-12-21 PROCEDURE — A4562 PESSARY, NON RUBBER,ANY TYPE: HCPCS | Performed by: OBSTETRICS & GYNECOLOGY

## 2023-12-21 NOTE — NURSING NOTE
"Initial /81 (BP Location: Left arm, Patient Position: Chair, Cuff Size: Adult Large)   Pulse 78   Temp 97.8  F (36.6  C) (Tympanic)   Wt 58.1 kg (128 lb)   LMP  (LMP Unknown)   BMI 21.97 kg/m   Estimated body mass index is 21.97 kg/m  as calculated from the following:    Height as of 12/14/23: 1.626 m (5' 4\").    Weight as of this encounter: 58.1 kg (128 lb). .        Kary Ferrari MA    "

## 2023-12-21 NOTE — PROGRESS NOTES
HPI:  Paz Workman, 78 year old,  presents with complaints of prolapse, pain with current pessary. States was fitted last week. Has had to continue to replace/push up device with associated discomfort from it reaching introitus. No bleeding. Otherwise feeling well     Past Medical History   History reviewed. No pertinent past medical history.     Past Surgical History         Past Surgical History:   Procedure Laterality Date    cataract surgey Bilateral 2021    COLPORRHAPHY ANTERIOR Bilateral 2023     Procedure: anterior repair;  Surgeon: Ellie Mendoza MD;  Location: [facility]    LAPAROSCOPIC ASSISTED HYSTERECTOMY VAGINAL, BILATERAL SALPINGO-OOPHORECTOMY, COMBINED Bilateral 2023     Procedure: HYSTERECTOMY, VAGINAL, WITH SALPINGO-OOPHORECTOMY, Bilateral and;  Surgeon: Ellie Mendoza MD;  Location: [facility]         Social History   Social History            Socioeconomic History    Marital status:        Spouse name: Not on file    Number of children: Not on file    Years of education: Not on file    Highest education level: Not on file   Occupational History    Not on file   Tobacco Use    Smoking status: Never    Smokeless tobacco: Never   Vaping Use    Vaping Use: Never used   Substance and Sexual Activity    Alcohol use: Yes       Comment: occ    Drug use: Never    Sexual activity: Not Currently       Birth control/protection: Post-menopausal   Other Topics Concern    Parent/sibling w/ CABG, MI or angioplasty before 65F 55M? Not Asked   Social History Narrative    Not on file      Social Determinants of Health      Financial Resource Strain: Not on file   Food Insecurity: Not on file   Transportation Needs: Not on file   Physical Activity: Not on file   Stress: Not on file   Social Connections: Not on file   Interpersonal Safety: Not on file   Housing Stability: Not on file         Family History   History reviewed. No pertinent family history.         Current Outpatient  "Prescriptions          Current Outpatient Medications   Medication Sig Dispense Refill    amLODIPine (NORVASC) 5 MG tablet Take 7.5 mg by mouth        CALCIUM-MAGNESIUM-ZINC PO Take 1 tablet by mouth        Docusate Sodium (STOOL SOFTENER LAXATIVE PO)          Glucosamine HCl 1500 MG TABS Take 3,000 mg by mouth        Multiple Vitamin (MULTI-VITAMINS) TABS Take 1 tablet by mouth        Polyethyl Glycol-Propyl Glycol (SYSTANE OP)          acetaminophen (TYLENOL) 325 MG tablet Take 3 tablets (975 mg) by mouth every 6 hours as needed for mild pain (Patient not taking: Reported on 12/14/2023) 50 tablet 0    Cranberry 600 MG TABS Uses whole cranberries in smoothies (Patient not taking: Reported on 12/14/2023)                Allergies:          Allergies   Allergen Reactions    Atorvastatin Muscle Pain (Myalgia)    Ibuprofen Swelling    Lisinopril Other (See Comments)         ROS:  See HPI        Physical Exam:  BP (!) 151/75 (BP Location: Left arm, Patient Position: Chair, Cuff Size: Adult Regular)   Pulse 68   Temp 97.5  F (36.4  C) (Tympanic)   Resp 16   Ht 1.626 m (5' 4\")   Wt 58.2 kg (128 lb 6.4 oz)   LMP  (LMP Unknown)   BMI 22.04 kg/m        Appearance: well developed, well nourished, no acute distress  Head Exam normocephalic, no lesions or deformities  Abdomen: soft, non-tender, no masses, no organomegaly, no hernia,  Skin: no lesions  Extremities: no edema  Psych: orientated x3     Genitourinary Exam  Vulva: normal, no lesions  Urethral meatus: normal size and location, no lesions or discharge  Urethra: no tenderness or masses  Bladder: no fullness or tenderness  Vagina: grade 3 cystocele and no rectocele  Cervix: surgically absent  Uterus: surgically absent  Adnexa: no palpable masses bilaterally     Procedure: Pessary Fitting    Indication:    Incomplete bladder emptying  Cystocele        The risks and benefits were discussed.  The patient was placed in the dorsal-lithotomy position.  Donut previously " placed removed. Placed a size 4 ring with support. Had patient walk, void x2, sit for a few minutes and she had no pain, pessary remained in place and did not fall down.      Assessment/Plan:  Paz Workman is a 78 year old  with cystocele, pain from pessary. Reviewed options with pt and she prefers to try a different type of pessary. Has use a ring before, prior to hy and liked this. Patient fitted for pessary as above and did well with #4 ring with support. Discussed returning in 1 month for recheck and then can space out visits more. Otherwise will call with pessary pain, falling out, voiding issues,bleeding, etc sooner.      Separate from pessary fitting I spent 30 min reviewing chart, obtaining hx counseling examining coordinating care documenting    Jean Hanson MD   2023 10:20 AM

## 2023-12-22 ENCOUNTER — HOSPITAL ENCOUNTER (OUTPATIENT)
Dept: CT IMAGING | Facility: CLINIC | Age: 78
Discharge: HOME | End: 2023-12-22
Attending: OBSTETRICS & GYNECOLOGY | Admitting: OBSTETRICS & GYNECOLOGY
Payer: COMMERCIAL

## 2023-12-22 ENCOUNTER — TELEPHONE (OUTPATIENT)
Dept: OBGYN | Facility: CLINIC | Age: 78
End: 2023-12-22

## 2023-12-22 ENCOUNTER — OFFICE VISIT (OUTPATIENT)
Dept: OBGYN | Facility: CLINIC | Age: 78
End: 2023-12-22
Payer: COMMERCIAL

## 2023-12-22 ENCOUNTER — TELEPHONE (OUTPATIENT)
Dept: UROLOGY | Facility: CLINIC | Age: 78
End: 2023-12-22

## 2023-12-22 VITALS
SYSTOLIC BLOOD PRESSURE: 124 MMHG | BODY MASS INDEX: 21.85 KG/M2 | RESPIRATION RATE: 16 BRPM | TEMPERATURE: 97.5 F | DIASTOLIC BLOOD PRESSURE: 62 MMHG | WEIGHT: 128 LBS | HEART RATE: 70 BPM | HEIGHT: 64 IN

## 2023-12-22 DIAGNOSIS — N30.00 ACUTE CYSTITIS WITHOUT HEMATURIA: ICD-10-CM

## 2023-12-22 DIAGNOSIS — N81.10 VAGINAL WALL PROLAPSE: Primary | ICD-10-CM

## 2023-12-22 DIAGNOSIS — C54.1 ENDOMETRIAL CANCER (H): ICD-10-CM

## 2023-12-22 DIAGNOSIS — R10.2 PELVIC PRESSURE IN FEMALE: ICD-10-CM

## 2023-12-22 LAB
ALBUMIN UR-MCNC: NEGATIVE MG/DL
APPEARANCE UR: ABNORMAL
BACTERIA #/AREA URNS HPF: ABNORMAL /HPF
BILIRUB UR QL STRIP: NEGATIVE
COLOR UR AUTO: YELLOW
GLUCOSE UR STRIP-MCNC: NEGATIVE MG/DL
HGB UR QL STRIP: ABNORMAL
KETONES UR STRIP-MCNC: NEGATIVE MG/DL
LEUKOCYTE ESTERASE UR QL STRIP: ABNORMAL
NITRATE UR QL: NEGATIVE
PH UR STRIP: 5.5 [PH] (ref 5–7)
RBC #/AREA URNS AUTO: ABNORMAL /HPF
SP GR UR STRIP: <=1.005 (ref 1–1.03)
SQUAMOUS #/AREA URNS AUTO: ABNORMAL /LPF
UROBILINOGEN UR STRIP-ACNC: 0.2 E.U./DL
WBC #/AREA URNS AUTO: ABNORMAL /HPF

## 2023-12-22 PROCEDURE — 81001 URINALYSIS AUTO W/SCOPE: CPT | Performed by: OBSTETRICS & GYNECOLOGY

## 2023-12-22 PROCEDURE — 99213 OFFICE O/P EST LOW 20 MIN: CPT | Mod: 25 | Performed by: OBSTETRICS & GYNECOLOGY

## 2023-12-22 PROCEDURE — 57160 INSERT PESSARY/OTHER DEVICE: CPT | Performed by: OBSTETRICS & GYNECOLOGY

## 2023-12-22 PROCEDURE — 74177 CT ABD & PELVIS W/CONTRAST: CPT

## 2023-12-22 PROCEDURE — 250N000011 HC RX IP 250 OP 636: Performed by: OBSTETRICS & GYNECOLOGY

## 2023-12-22 PROCEDURE — 250N000009 HC RX 250: Performed by: OBSTETRICS & GYNECOLOGY

## 2023-12-22 PROCEDURE — 87086 URINE CULTURE/COLONY COUNT: CPT | Performed by: OBSTETRICS & GYNECOLOGY

## 2023-12-22 RX ORDER — IOPAMIDOL 755 MG/ML
63 INJECTION, SOLUTION INTRAVASCULAR ONCE
Status: COMPLETED | OUTPATIENT
Start: 2023-12-22 | End: 2023-12-22

## 2023-12-22 RX ADMIN — SODIUM CHLORIDE 56 ML: 9 INJECTION, SOLUTION INTRAVENOUS at 08:36

## 2023-12-22 RX ADMIN — IOPAMIDOL 63 ML: 755 INJECTION, SOLUTION INTRAVENOUS at 08:36

## 2023-12-22 NOTE — PROGRESS NOTES
"Pessary follow-up visit.    Paz Workman is here for pessary concerns.  She was fitted for a size 4 ring last visit.  She was worried the donut would fall out and she had concerns about how to deal with it if it did.  She was allowed to ambulate in the office and was able to void with the new pessary.  When she went home, she felt some pressure.  It got a little better.  Then at times she had pressure or pain.  Now she has a little urinary leakage when she leans forward.  It is just a few drips, but it bothers her.  She has to urinate every 2 hours.  She states it feels like \"before you have sex\" with the pessary in.  She doesn't want to go without a pessary because it makes her anxious to have a bulge.  It makes her nervous when she wipes or showers and notices it.    /62 (BP Location: Left arm, Patient Position: Chair, Cuff Size: Adult Regular)   Pulse 70   Temp 97.5  F (36.4  C) (Tympanic)   Resp 16   Ht 1.626 m (5' 4\")   Wt 58.1 kg (128 lb)   LMP  (LMP Unknown)   BMI 21.97 kg/m       EXAM:    On exam, the ring is palpable slightly  the labia.  Procedure: Pessary Fitting  Indication:    Encounter Diagnoses   Name Primary?    Vaginal wall prolapse Yes    Pelvic pressure in female            The  A size 3 ring with support pessary was initially tried.  She was allowed to ambulate and void. She tolerated the initial pessary. She did well with the size 3 ring pessary and was discharged with one.  She is to return in 2 weeks for recheck.  She was advised to return sooner if she is having any discomfort, bleeding or concerns.      IMP: Pelvic prolapse, having discomfort and urinary leakage with the size 4 ring. On exam, it felt a little tight.  It was replaced with a size 3 ring.      Her UA was borderline for low grade infection last visit.  There was a small amount of blood and leukocytes today.  Will treat with antibiotics.  She never picked up antibiotics that were sent last visit, so she " was encouraged to pick them up today.    Multiple questions answered today.  She does seem to have significant anxiety that complicates the discussions.  She seems to forget and repeat questions multiple times.    Plan: Follow up 2-4 weeks    30 minutes spent on 12/22/2023 doing chart review, review of test results, patient visit, and documentation.      Ellie Mendoza MD on 12/22/2023 at 2:14 PM

## 2023-12-22 NOTE — TELEPHONE ENCOUNTER
12/22 Called patient and left voicemail. Provided patient with [phone] as a call back number. Patient will need to schedule an appointment with Dr. Regan. Dr. Regan practices out of the [place] [facility] location.     [facility]   Dermatology, Surgery, Urology  [facility]

## 2023-12-22 NOTE — TELEPHONE ENCOUNTER
[facility] Call Center    Phone Message    May a detailed message be left on voicemail: yes     Reason for Call: Other: Patient is calling stating she is having trouble with her pessary and was told to call if she needed to be seen sooner due to that. Patient does have an appointment but not until January. Please call the patient back to discuss. Thank you.      Action Taken: Other: obgyn    Travel Screening: Not Applicable

## 2023-12-22 NOTE — NURSING NOTE
"Initial /62 (BP Location: Left arm, Patient Position: Chair, Cuff Size: Adult Regular)   Pulse 70   Temp 97.5  F (36.4  C) (Tympanic)   Resp 16   Ht 1.626 m (5' 4\")   Wt 58.1 kg (128 lb)   LMP  (LMP Unknown)   BMI 21.97 kg/m   Estimated body mass index is 21.97 kg/m  as calculated from the following:    Height as of this encounter: 1.626 m (5' 4\").    Weight as of this encounter: 58.1 kg (128 lb). .    Daisha Gregorio, SANDRA    "

## 2023-12-22 NOTE — TELEPHONE ENCOUNTER
"Per Shantel Roach, ask what patient is looking for at appointment. If she wants any procedures/surgeries she should see Dr. López or Dr. Regan.    Called patient and discussed appointment scheduled with Shantel Roach on 01/02/2024. Patient stated she is wanting to be fitted for a pessary due to her fallen bladder. Patient stated her daughter has a similar thing and was told by her daughter that she should see a urologist rather than a gynecologist. Discussed with patient that Shantel Roach is a physician assistant and that she is not able to fit patient for a pessary or discuss surgery as she cannot do surgery on patient. Patient informed that there are no urologists in [place] that can help her with her fallen bladder and that she needs to see a uro-gynecologist instead if she'd like to pursue care from urology department. Patient stated she was fitted in gynecology for a pessary but that she needs to \"push up to pee\". Patient stated that she has an appointment tomorrow 12/21/23 to be refitted. Patient informed that if after her gynecology appointment she is still not having any luck with her pessary then she can certainly see urology for care, but will need to see certain providers as not everyone in the department is appropriate for her to see. Patient verbalized understanding. Patient informed that writer will watch out and call her to see what she would like to do after her visit with gynecology and will cancel appointment with Shantel Roach as she is not appropriate.    Arlen PULIDO CMA 12/20/2023  "

## 2023-12-23 LAB — BACTERIA UR CULT: NORMAL

## 2024-01-10 ENCOUNTER — ONCOLOGY VISIT (OUTPATIENT)
Dept: ONCOLOGY | Facility: HOSPITAL | Age: 79
End: 2024-01-10
Attending: OBSTETRICS & GYNECOLOGY
Payer: COMMERCIAL

## 2024-01-10 VITALS
DIASTOLIC BLOOD PRESSURE: 81 MMHG | TEMPERATURE: 97.9 F | HEART RATE: 72 BPM | RESPIRATION RATE: 16 BRPM | WEIGHT: 126 LBS | OXYGEN SATURATION: 98 % | SYSTOLIC BLOOD PRESSURE: 150 MMHG | BODY MASS INDEX: 21.63 KG/M2

## 2024-01-10 DIAGNOSIS — C54.1 ENDOMETRIAL CARCINOMA (H): Primary | ICD-10-CM

## 2024-01-10 PROCEDURE — G0463 HOSPITAL OUTPT CLINIC VISIT: HCPCS | Performed by: OBSTETRICS & GYNECOLOGY

## 2024-01-10 PROCEDURE — 99215 OFFICE O/P EST HI 40 MIN: CPT | Performed by: OBSTETRICS & GYNECOLOGY

## 2024-01-10 RX ORDER — POLYETHYLENE GLYCOL 3350 17 G/17G
1 POWDER, FOR SOLUTION ORAL DAILY PRN
COMMUNITY

## 2024-01-10 ASSESSMENT — PAIN SCALES - GENERAL: PAINLEVEL: NO PAIN (0)

## 2024-01-10 NOTE — NURSING NOTE
"Oncology Rooming Note    January 10, 2024 11:25 AM   Paz Workman is a 79 year old female who presents for:    Chief Complaint   Patient presents with    Oncology Clinic Visit     Gyn Onc follow up     Initial Vitals: BP (!) 150/81   Pulse 72   Temp 97.9  F (36.6  C)   Resp 16   Wt 57.2 kg (126 lb)   LMP  (LMP Unknown)   SpO2 98%   BMI 21.63 kg/m   Estimated body mass index is 21.63 kg/m  as calculated from the following:    Height as of 12/22/23: 1.626 m (5' 4\").    Weight as of this encounter: 57.2 kg (126 lb). Body surface area is 1.61 meters squared.  No Pain (0) Comment: Data Unavailable   No LMP recorded (lmp unknown). Patient has had a hysterectomy.  Allergies reviewed: Yes  Medications reviewed: Yes    Medications: Medication refills not needed today.  Pharmacy name entered into [employer]: [facility] - [place], [place] - [address]    Frailty Screening:   Is the patient here for a new oncology consult visit in cancer care? 2. No      Clinical concerns: C/O constipation. Taking stool softener BID. States she took Miralax daily for a week and thinks that was too much. She does eat plenty of fiber and drinks lots of water. We discussed OTC stool softener/laxative combo, or decreased dose of Miralax. She verbalized her understanding.   Dr. Nicholson was NOT notified.      Loli Jha RN              "

## 2024-01-10 NOTE — LETTER
1/10/2024         RE: Paz Workman  [address]  [place] [address]        Dear Colleague,    Thank you for referring your patient, Paz Workman, to the [facility]. Please see a copy of my visit note below.    x      Again, thank you for allowing me to participate in the care of your patient.        Sincerely,        Delmer Nicholson MD

## 2024-01-18 ENCOUNTER — OFFICE VISIT (OUTPATIENT)
Dept: OBGYN | Facility: CLINIC | Age: 79
End: 2024-01-18
Payer: COMMERCIAL

## 2024-01-18 VITALS
DIASTOLIC BLOOD PRESSURE: 83 MMHG | BODY MASS INDEX: 21.8 KG/M2 | HEART RATE: 67 BPM | WEIGHT: 127 LBS | SYSTOLIC BLOOD PRESSURE: 131 MMHG | TEMPERATURE: 97.6 F

## 2024-01-18 DIAGNOSIS — Z46.89 PESSARY MAINTENANCE: Primary | ICD-10-CM

## 2024-01-18 PROCEDURE — 99213 OFFICE O/P EST LOW 20 MIN: CPT | Performed by: OBSTETRICS & GYNECOLOGY

## 2024-01-18 NOTE — NURSING NOTE
"Initial /83 (BP Location: Left arm, Patient Position: Chair, Cuff Size: Adult Large)   Pulse 67   Temp 97.6  F (36.4  C) (Tympanic)   Wt 57.6 kg (127 lb)   LMP  (LMP Unknown)   BMI 21.80 kg/m   Estimated body mass index is 21.8 kg/m  as calculated from the following:    Height as of 12/22/23: 1.626 m (5' 4\").    Weight as of this encounter: 57.6 kg (127 lb). .      Kary Ferrari MA    "

## 2024-01-18 NOTE — PROGRESS NOTES
Pessary follow-up visit.    Paz Workman is here for pessary follow-up.  She recently had a size 3 ring pessary placed and states that she has done well at home with this.  Occasionally has to hold on side with a bowel movement but otherwise otherwise tolerating just fine.  Denies any bleeding.  No pain.    /83 (BP Location: Left arm, Patient Position: Chair, Cuff Size: Adult Large)   Pulse 67   Temp 97.6  F (36.4  C) (Tympanic)   Wt 57.6 kg (127 lb)   LMP  (LMP Unknown)   BMI 21.80 kg/m         EXAM:  Pelvic: 3 removed in the vagina with no obvious lesions, benign atrophy noted, no bleeding  Pessary replaced without difficulty      A/P:  Returns today for pessary follow-up.  She has a #3 ring in place.  This was removed and exam was benign.  Pessary was cleaned and replaced and recommended that she follow-up in 2 to 3 months for recheck.  If continuing to do well at that point can space to 3 to 4 months.  Patient states understanding and agrees with plan of care.    I spent 20 minutes reviewing chart, obtaining history, counseling, examining, coordinating care, documenting this encounter.

## 2024-02-12 NOTE — PROGRESS NOTES
Follow Up Notes on Referred Patient    Date: 1/10/2024       Dr. Silver referring provider defined for this encounter.       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  She is feeling well overall.  She is eating and drinking well has no nausea vomiting fever chills no vaginal bleeding or discharge.  No B symptoms.      Past Medical History:    No past medical history on file.      Past Surgical History:    Past Surgical History:   Procedure Laterality Date    cataract surgey Bilateral 2021    COLPORRHAPHY ANTERIOR Bilateral 2023    Procedure: anterior repair;  Surgeon: Ellie Mendoza MD;  Location: [facility]    LAPAROSCOPIC ASSISTED HYSTERECTOMY VAGINAL, BILATERAL SALPINGO-OOPHORECTOMY, COMBINED Bilateral 2023    Procedure: HYSTERECTOMY, VAGINAL, WITH SALPINGO-OOPHORECTOMY, Bilateral and;  Surgeon: Ellie Mendoza MD;  Location: [facility]         Health Maintenance Due   Topic Date Due    DEXA  Never done    ADVANCE CARE PLANNING  Never done    MAMMO SCREENING  Never done    HEPATITIS C SCREENING  Never done    DTAP/TDAP/TD IMMUNIZATION (1 - Tdap) Never done    LIPID  Never done    RSV VACCINE (Pregnancy & 60+) (1 - 1-dose 60+ series) Never done    INFLUENZA VACCINE (1) Never done    COVID-19 Vaccine (2023- season) 2023       Current Medications:     Current Outpatient Medications   Medication Sig Dispense Refill    acetaminophen (TYLENOL) 325 MG tablet Take 3 tablets (975 mg) by mouth every 6 hours as needed for mild pain 50 tablet 0    amLODIPine (NORVASC) 5 MG tablet Take 7.5 mg by mouth      CALCIUM-MAGNESIUM-ZINC PO Take 1 tablet by mouth      Cranberry 600 MG TABS Uses whole cranberries in smoothies      Docusate Sodium (STOOL SOFTENER LAXATIVE PO)       Glucosamine HCl 1500 MG TABS Take 3,000 mg by mouth      Lutein-Zeaxanthin 6-1 MG TABS  Take 1 capsule by mouth daily      Multiple Vitamin (MULTI-VITAMINS) TABS Take 1 tablet by mouth      Polyethyl Glycol-Propyl Glycol (SYSTANE OP)       polyethylene glycol (MIRALAX) 17 g packet Take 1 packet by mouth daily as needed for constipation           Allergies:        Allergies   Allergen Reactions    Atorvastatin Muscle Pain (Myalgia)    Ibuprofen Swelling    Lisinopril Other (See Comments)        Social History:     Social History     Tobacco Use    Smoking status: Never    Smokeless tobacco: Never   Substance Use Topics    Alcohol use: Yes     Comment: occ       History   Drug Use Unknown         Family History:   One of her daughters had colon cancer in her early 50s.  The other daughter had a cancer in her cheek and was thought to have primary peritoneal cancer however has not received chemotherapy and possibly be did not have a cancer diagnosis after all.        Physical Exam:     BP (!) 150/81   Pulse 72   Temp 97.9  F (36.6  C)   Resp 16   Wt 57.2 kg (126 lb)   LMP  (LMP Unknown)   SpO2 98%   BMI 21.63 kg/m    Body mass index is 21.63 kg/m .    General Appearance:  healthy and alert, no distress     Musculoskeletal:         extremities non tender and without edema     Neurological:   normal gait, no gross defects                Psychiatric:      appropriate mood and affect                               Hematological:            normal cervical, supraclavicular and inguinal lymph nodes                Gastrointestinal:          abdomen soft, non-tender, non-distended, no organomegaly or masses, well-healed incisions.     Genitourinary: External genitalia and urethral meatus appears normal.  Vagina is smooth without nodularity or masses.  Well-healed and intact vaginal cuff.  Bimanual exam reveal no masses, nodularity or fullness.  Recto-vaginal exam confirms these findings.        Assessment:     Paz Workman is a 79 year old woman with a diagnosis of unstaged none invasive high grade serous  endometrial cancer.      A total of 42 minutes was spent with the patient, documentation, chart review, coordination of care, in counseling the patient and/or treatment planning.        1. 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 Personal history of breast cancer at age 61  3. Cyst in pancreatic tail     We discussed the finding of the CT scan which does not show any evidence of static disease.  Of note she does have small 8mm cyst in the tail of her her pancreas, which is stable.  Discussed again typical management. She at this point does not want to proceed with surgery for staging.  She also does not want any adjuvant chemotherapy at this point.  We did discuss to proceed with surveillance visit in 3 months and reassess.  We did discuss surveillance every 3 months for the first 2 years.  Followed by every 6 months for the next 3 years followed by yearly. The patient agrees with this plan she is very appreciative of her care all questions were answered.           Delmer Nicholson MD, MS    Department of Obstetrics and Gynecology   Division of Gynecologic Oncology   [facility]  Phone: [phone]      CC  Patient Care Team:  Jessica Shoemaker MD as PCP - General (Family Medicine)  Ellie Mendoza MD as Assigned OBGYN Provider  Delmer Nicholson MD as MD (Gynecologic Oncology)  Delmer Nicholson MD as Assigned Cancer Care Provider  Shantel Roach PA-C as Physician Assistant (Urology)

## 2024-02-24 ENCOUNTER — HEALTH MAINTENANCE LETTER (OUTPATIENT)
Age: 79
End: 2024-02-24

## 2024-05-01 ENCOUNTER — OFFICE VISIT (OUTPATIENT)
Dept: OBGYN | Facility: CLINIC | Age: 79
End: 2024-05-01
Payer: COMMERCIAL

## 2024-05-01 VITALS
SYSTOLIC BLOOD PRESSURE: 136 MMHG | TEMPERATURE: 98.2 F | WEIGHT: 128 LBS | HEART RATE: 73 BPM | BODY MASS INDEX: 24.17 KG/M2 | DIASTOLIC BLOOD PRESSURE: 76 MMHG | HEIGHT: 61 IN | RESPIRATION RATE: 16 BRPM

## 2024-05-01 DIAGNOSIS — N81.11 CYSTOCELE, MIDLINE: Primary | ICD-10-CM

## 2024-05-01 PROCEDURE — 57160 INSERT PESSARY/OTHER DEVICE: CPT | Performed by: OBSTETRICS & GYNECOLOGY

## 2024-05-01 PROCEDURE — A4561 PESSARY RUBBER, ANY TYPE: HCPCS | Performed by: OBSTETRICS & GYNECOLOGY

## 2024-05-01 NOTE — NURSING NOTE
"Initial /76 (BP Location: Right arm, Patient Position: Chair, Cuff Size: Adult Regular)   Pulse 73   Temp 98.2  F (36.8  C) (Tympanic)   Resp 16   Ht 1.549 m (5' 1\")   Wt 58.1 kg (128 lb)   LMP  (LMP Unknown)   BMI 24.19 kg/m   Estimated body mass index is 24.19 kg/m  as calculated from the following:    Height as of this encounter: 1.549 m (5' 1\").    Weight as of this encounter: 58.1 kg (128 lb). .    "

## 2024-05-01 NOTE — PROGRESS NOTES
"Pessary follow-up visit.    Paz Workman is here for a pessary check. About once or twice a week, she gets pain on the right side from the pessary. It improves with sitting.    /76 (BP Location: Right arm, Patient Position: Chair, Cuff Size: Adult Regular)   Pulse 73   Temp 98.2  F (36.8  C) (Tympanic)   Resp 16   Ht 1.549 m (5' 1\")   Wt 58.1 kg (128 lb)   LMP  (LMP Unknown)   BMI 24.19 kg/m       EXAM:    General Appearance: Pleasant, alert, appropriate appearance for age. No acute distress  Head Exam: Normocephalic, without obvious abnormality.  Genitourinary Exam Female:   External genitalia: atrophic vulva  Urinary system: urethral meatus normal  Vagina: mucosa atrophic and pale, no excoriations or ulcerations noted.    The size 3 ring pessary was removed.    Procedure: Pessary Fitting  Indication:    Encounter Diagnosis   Name Primary?    Cystocele, midline Yes       A size 2 ring pessary was tried.  She was allowed to ambulate and void. She did well with the size 2 ring pessary and was discharged with one.   She was advised to return sooner if she is having any discomfort, bleeding or concerns.     IMP: Pelvic prolapse - fitted for a smaller pessary today due to discomfort.    Plan: Follow up in 3 months    Elile Mendoza MD on 5/1/2024 at 11:27 AM      "

## 2024-05-08 ENCOUNTER — TELEPHONE (OUTPATIENT)
Dept: ONCOLOGY | Facility: HOSPITAL | Age: 79
End: 2024-05-08
Payer: COMMERCIAL

## 2024-05-08 NOTE — TELEPHONE ENCOUNTER
Called pt to see if she wanted to reschedule her appts for CT and f/u as she cancelled them on 4/5/24 and 4/22/24.  Had to LVM to call back and schedule.

## 2024-05-13 ENCOUNTER — TELEPHONE (OUTPATIENT)
Dept: RADIATION ONCOLOGY | Facility: HOSPITAL | Age: 79
End: 2024-05-13
Payer: COMMERCIAL

## 2024-08-01 ENCOUNTER — OFFICE VISIT (OUTPATIENT)
Dept: OBGYN | Facility: CLINIC | Age: 79
End: 2024-08-01
Payer: COMMERCIAL

## 2024-08-01 VITALS
RESPIRATION RATE: 16 BRPM | DIASTOLIC BLOOD PRESSURE: 70 MMHG | WEIGHT: 126.6 LBS | HEART RATE: 69 BPM | BODY MASS INDEX: 23.9 KG/M2 | TEMPERATURE: 97.6 F | SYSTOLIC BLOOD PRESSURE: 132 MMHG | HEIGHT: 61 IN

## 2024-08-01 DIAGNOSIS — N81.11 CYSTOCELE, MIDLINE: Primary | ICD-10-CM

## 2024-08-01 PROCEDURE — 99212 OFFICE O/P EST SF 10 MIN: CPT | Performed by: OBSTETRICS & GYNECOLOGY

## 2024-08-01 PROCEDURE — G2211 COMPLEX E/M VISIT ADD ON: HCPCS | Performed by: OBSTETRICS & GYNECOLOGY

## 2024-08-01 PROCEDURE — 99459 PELVIC EXAMINATION: CPT | Performed by: OBSTETRICS & GYNECOLOGY

## 2024-08-01 NOTE — PROGRESS NOTES
"Pessary follow-up visit.    Paz Workman is not complaining of any problems with the pessary. She is here for it to be removed, cleaned and reinserted.      /70 (BP Location: Left arm, Patient Position: Chair, Cuff Size: Adult Regular)   Pulse 69   Temp 97.6  F (36.4  C) (Tympanic)   Resp 16   Ht 1.549 m (5' 1\")   Wt 57.4 kg (126 lb 9.6 oz)   LMP  (LMP Unknown)   BMI 23.92 kg/m       EXAM:    General Appearance: Pleasant, alert, appropriate appearance for age. No acute distress  Head Exam: Normocephalic, without obvious abnormality.  Genitourinary Exam Female:   External genitalia: atrophic vulva  Urinary system: urethral meatus normal  Vagina: mucosa atrophic and pale, no excoriations or ulcerations noted.    The size 2 ring pessary was removed, cleaned and reinserted without issue.    IMP: Pelvic prolapse - doing well with pessary.    Plan: Follow up in 3-6 months.    Ellie Mendoza MD on 8/1/2024 at 11:08 AM       "

## 2024-08-01 NOTE — NURSING NOTE
"Initial /70 (BP Location: Left arm, Patient Position: Chair, Cuff Size: Adult Regular)   Pulse 69   Temp 97.6  F (36.4  C) (Tympanic)   Resp 16   Ht 1.549 m (5' 1\")   Wt 57.4 kg (126 lb 9.6 oz)   LMP  (LMP Unknown)   BMI 23.92 kg/m   Estimated body mass index is 23.92 kg/m  as calculated from the following:    Height as of this encounter: 1.549 m (5' 1\").    Weight as of this encounter: 57.4 kg (126 lb 9.6 oz). .  Daisha Gregorio, SANDRA    "

## 2024-09-27 ENCOUNTER — HOSPITAL ENCOUNTER (EMERGENCY)
Facility: CLINIC | Age: 79
Discharge: HOME | End: 2024-09-27
Payer: COMMERCIAL

## 2024-09-27 VITALS
BODY MASS INDEX: 23.41 KG/M2 | OXYGEN SATURATION: 95 % | HEIGHT: 61 IN | SYSTOLIC BLOOD PRESSURE: 132 MMHG | WEIGHT: 124 LBS | DIASTOLIC BLOOD PRESSURE: 73 MMHG | TEMPERATURE: 98.5 F | RESPIRATION RATE: 19 BRPM | HEART RATE: 64 BPM

## 2024-09-27 DIAGNOSIS — R42 DIZZINESS: Primary | ICD-10-CM

## 2024-09-27 PROCEDURE — 99284 EMERGENCY DEPT VISIT MOD MDM: CPT

## 2024-09-27 PROCEDURE — 99283 EMERGENCY DEPT VISIT LOW MDM: CPT

## 2024-09-27 PROCEDURE — 93010 ELECTROCARDIOGRAM REPORT: CPT

## 2024-09-27 PROCEDURE — 258N000003 HC RX IP 258 OP 636

## 2024-09-27 PROCEDURE — 96360 HYDRATION IV INFUSION INIT: CPT

## 2024-09-27 PROCEDURE — 250N000013 HC RX MED GY IP 250 OP 250 PS 637

## 2024-09-27 PROCEDURE — 93005 ELECTROCARDIOGRAM TRACING: CPT

## 2024-09-27 RX ORDER — MECLIZINE HYDROCHLORIDE 25 MG/1
25 TABLET ORAL ONCE
Status: COMPLETED | OUTPATIENT
Start: 2024-09-27 | End: 2024-09-27

## 2024-09-27 RX ORDER — MECLIZINE HYDROCHLORIDE 25 MG/1
25 TABLET ORAL 3 TIMES DAILY PRN
Qty: 10 TABLET | Refills: 0 | Status: SHIPPED | OUTPATIENT
Start: 2024-09-27

## 2024-09-27 RX ADMIN — MECLIZINE HYDROCHLORIDE 25 MG: 25 TABLET ORAL at 10:04

## 2024-09-27 RX ADMIN — SODIUM CHLORIDE, POTASSIUM CHLORIDE, SODIUM LACTATE AND CALCIUM CHLORIDE 1000 ML: 600; 310; 30; 20 INJECTION, SOLUTION INTRAVENOUS at 10:09

## 2024-09-27 ASSESSMENT — ACTIVITIES OF DAILY LIVING (ADL)
ADLS_ACUITY_SCORE: 35

## 2024-09-27 ASSESSMENT — COLUMBIA-SUICIDE SEVERITY RATING SCALE - C-SSRS
6. HAVE YOU EVER DONE ANYTHING, STARTED TO DO ANYTHING, OR PREPARED TO DO ANYTHING TO END YOUR LIFE?: NO
2. HAVE YOU ACTUALLY HAD ANY THOUGHTS OF KILLING YOURSELF IN THE PAST MONTH?: NO
1. IN THE PAST MONTH, HAVE YOU WISHED YOU WERE DEAD OR WISHED YOU COULD GO TO SLEEP AND NOT WAKE UP?: NO

## 2024-09-27 NOTE — DISCHARGE INSTRUCTIONS
You were seen in the emergency department today for lightheadedness/dizziness.  We gave you a medication called meclizine to help with symptoms, of which I will send you home with a prescription.    The neck step is to meet with the vestibular rehab physical therapist for ongoing evaluation and management of your symptoms.    Please also follow up with your primary doctor within the next 1 to 2 weeks for ongoing evaluation and management of your symptoms.    Return to the emergency department with new or worsening symptoms that you find concerning.

## 2024-09-27 NOTE — ED TRIAGE NOTES
Pt presents to ED with c/o dizziness for past month. Pt saw clinic provider on Monday. Pt also c/o right ear pain since flying on August 22. Pt states dizziness is constant and has been getting worse.      Triage Assessment (Adult)       Row Name 09/27/24 0908          Triage Assessment    Airway WDL WDL        Respiratory WDL    Respiratory WDL WDL        Skin Circulation/Temperature WDL    Skin Circulation/Temperature WDL WDL        Cognitive/Neuro/Behavioral WDL    Cognitive/Neuro/Behavioral WDL WDL

## 2024-09-27 NOTE — ED PROVIDER NOTES
"[facility]  Emergency Department Visit Note    PATIENT:  Paz Workman     79 year old     female      [id number]    Chief complaint:  Chief Complaint   Patient presents with    Dizziness     Dizziness for past month since flying. Ear has been \"bothering\" her and painful. Seen in clinic on Monday          History of present illness:  Patient is a 79 year old female with history of breast and endometrial cancer status post hysterectomy, CKD, HLD, ? brain mass presenting for evaluation of dizziness.    Has been ongoing for the past several weeks.  Occurs daily, intermittently.  Reports she had a single fall on September 10 after she stood up quickly after bed to use the restroom, otherwise has not had falls.  She feels like the dizziness is worse after she stands up and is active.  Also does have it intermittently when at rest.  Last week had an episode of bilateral fingers tingling/numb, though not currently.  No headache, vision changes, fever, chest pain, shortness of breath, vomiting, abdominal pain, other extremity numbness/tingling/weakness.      Review of Systems:  As in HPI above    /69   Pulse 80   Temp 98.5  F (36.9  C) (Oral)   Resp 23   Ht 1.549 m (5' 1\")   Wt 56.2 kg (124 lb)   LMP  (LMP Unknown)   SpO2 95%   BMI 23.43 kg/m        Physical Exam  Constitutional: laying in hospital bed, alert, oriented, in no apparent distress, and eyes open, easily visually tracking and looking around room  HEENT: normocephalic, atraumatic, pupils 2mm, equal, round, and reactive to light, sclerae anicteric, and extraocular motions intact  Neck: no stridor  Cardiovascular: regular rate and rhythm and no murmurs, rubs, or extra heart sounds  Pulmonary: breathing comfortably on room air and lungs clear to auscultation bilaterally  Abdominal: soft, non-tender, non-distended  Extremities/MSK: no peripheral edema  Skin: warm, dry  Neurologic: moves all four extremities spontaneously, CNII-XII " intact, 5/5  strength bilaterally, 5/5 strength in dorsiflexion and plantarflexion bilaterally, sensation intact to light touch in bilateral upper and lower extremities, no dysmetria on finger-nose-finger, and no nystagmus  Psychiatric: calm, appropriate      MDM:  Patient is a 79 year old female with above history presenting for evaluation of 3 weeks of intermittent dizziness.    Vitals normal. Exam reassuring, she appears well, not in distress, not acutely vertiginous, no focal neurologic deficit.    Unclear etiology but suspect likely peripheral cause, including BPPV.  Spectrum of orthostatic presyncope is possible, though that symptoms occur at rest argues against this.  Considered but doubt CVA or cervical artery dissection based on history, vitals, exam.  Does have history of possible previous brain mass consistent with meningioma in 2011, apparently as of 2013 patient has decided not to pursue further workup, she has no recollection of ever having this mass but it apparently has not been bothersome to her for the past 10+ years.  As such, I do not think advanced imaging of head/neck is emergently indicated.    I did review primary clinician visit from several days ago where she was seen for same.  Had workup at that time including basic labs that were reassuring without anemia or electrolyte abnormalities.  Patient concerned about UTI as she has previously had dizziness with prior UTIs, I reviewed urine culture 9/23/2024 demonstrating mixed urogenital berry without evidence of UTI.  I do not think labs or urine need to be repeated today as she is not having any new or worsening symptoms that would warrant rechecking.    Planning for 1 L crystalloid bolus for rehydration.  Meclizine for therapeutics.  Ultimately, anticipating vestibular rehab follow-up.    Disposition likely discharge pending response to therapeutics . Remainder of ED course below.    ED COURSE:  ED Course as of 09/27/24 1851   Fri Sep 27,  2024 0931 EKG 12-lead, tracing only  ECG:  - Ventricular rate 67 bpm, regular  - NV prolonged, QRS and QT intervals normal  - Axis left-deviated  - no ST segment or T wave changes concerning for acute ischemia  - Comparison to prior ECGs: New left axis deviation  - My independent interpretation: Sinus rhythm with left axis deviation, likely precordial reversal V2/V3, no acute ischemic changes or findings of explain dizziness   1113 Patient reassessed, feeling better after meclizine but would like to try walking.  Disposition pending ambulation trial.   1130 Patient reassessed, ambulated without lightheadedness/dizziness.  Still resting comfortably, appears well, vitals remain reassuring.  We discussed sending with meclizine, follow-up with vestibular rehab and primary within the next 1 to 2 weeks.  ED return precaution discussed in event of new or worsening symptoms.  She expressed understanding and agreement with this.       Encounter Diagnoses:  Final diagnoses:   Dizziness       Final disposition: discharge    Leeroy Subramanian MD  9/27/2024  11:13 AM   Emergency Medicine  [facility]      Leeroy Subramanian MD  09/27/24 4922

## 2025-01-14 ENCOUNTER — HOSPITAL ENCOUNTER (EMERGENCY)
Facility: CLINIC | Age: 80
Discharge: HOME | End: 2025-01-14
Attending: FAMILY MEDICINE
Payer: COMMERCIAL

## 2025-01-14 ENCOUNTER — APPOINTMENT (OUTPATIENT)
Dept: GENERAL RADIOLOGY | Facility: CLINIC | Age: 80
End: 2025-01-14
Attending: FAMILY MEDICINE
Payer: COMMERCIAL

## 2025-01-14 VITALS
RESPIRATION RATE: 16 BRPM | TEMPERATURE: 98.6 F | HEART RATE: 68 BPM | HEIGHT: 61 IN | SYSTOLIC BLOOD PRESSURE: 138 MMHG | BODY MASS INDEX: 23.6 KG/M2 | WEIGHT: 125 LBS | DIASTOLIC BLOOD PRESSURE: 87 MMHG | OXYGEN SATURATION: 96 %

## 2025-01-14 DIAGNOSIS — I95.1 ORTHOSTASIS: ICD-10-CM

## 2025-01-14 DIAGNOSIS — R07.89 OTHER CHEST PAIN: ICD-10-CM

## 2025-01-14 LAB
ALBUMIN SERPL BCG-MCNC: 4.2 G/DL (ref 3.5–5.2)
ALP SERPL-CCNC: 64 U/L (ref 40–150)
ALT SERPL W P-5'-P-CCNC: 20 U/L (ref 0–50)
ANION GAP SERPL CALCULATED.3IONS-SCNC: 8 MMOL/L (ref 7–15)
AST SERPL W P-5'-P-CCNC: 25 U/L (ref 0–45)
BASOPHILS # BLD AUTO: 0.1 10E3/UL (ref 0–0.2)
BASOPHILS NFR BLD AUTO: 1 %
BILIRUB SERPL-MCNC: 0.4 MG/DL
BUN SERPL-MCNC: 17.4 MG/DL (ref 8–23)
CALCIUM SERPL-MCNC: 10.1 MG/DL (ref 8.8–10.4)
CHLORIDE SERPL-SCNC: 101 MMOL/L (ref 98–107)
CREAT SERPL-MCNC: 0.96 MG/DL (ref 0.51–0.95)
D DIMER PPP FEU-MCNC: 0.64 UG/ML FEU (ref 0–0.5)
EGFRCR SERPLBLD CKD-EPI 2021: 60 ML/MIN/1.73M2
EOSINOPHIL # BLD AUTO: 0.1 10E3/UL (ref 0–0.7)
EOSINOPHIL NFR BLD AUTO: 1 %
ERYTHROCYTE [DISTWIDTH] IN BLOOD BY AUTOMATED COUNT: 12.7 % (ref 10–15)
GLUCOSE SERPL-MCNC: 112 MG/DL (ref 70–99)
HCO3 SERPL-SCNC: 27 MMOL/L (ref 22–29)
HCT VFR BLD AUTO: 39.2 % (ref 35–47)
HGB BLD-MCNC: 12.8 G/DL (ref 11.7–15.7)
HOLD SPECIMEN: NORMAL
IMM GRANULOCYTES # BLD: 0 10E3/UL
IMM GRANULOCYTES NFR BLD: 0 %
LIPASE SERPL-CCNC: 23 U/L (ref 13–60)
LYMPHOCYTES # BLD AUTO: 2.4 10E3/UL (ref 0.8–5.3)
LYMPHOCYTES NFR BLD AUTO: 38 %
MCH RBC QN AUTO: 31 PG (ref 26.5–33)
MCHC RBC AUTO-ENTMCNC: 32.7 G/DL (ref 31.5–36.5)
MCV RBC AUTO: 95 FL (ref 78–100)
MONOCYTES # BLD AUTO: 0.5 10E3/UL (ref 0–1.3)
MONOCYTES NFR BLD AUTO: 8 %
NEUTROPHILS # BLD AUTO: 3.3 10E3/UL (ref 1.6–8.3)
NEUTROPHILS NFR BLD AUTO: 52 %
NRBC # BLD AUTO: 0 10E3/UL
NRBC BLD AUTO-RTO: 0 /100
PLATELET # BLD AUTO: 205 10E3/UL (ref 150–450)
POTASSIUM SERPL-SCNC: 3.9 MMOL/L (ref 3.4–5.3)
PROT SERPL-MCNC: 7.1 G/DL (ref 6.4–8.3)
RBC # BLD AUTO: 4.13 10E6/UL (ref 3.8–5.2)
SODIUM SERPL-SCNC: 136 MMOL/L (ref 135–145)
TROPONIN T SERPL HS-MCNC: 10 NG/L
TROPONIN T SERPL HS-MCNC: 9 NG/L
WBC # BLD AUTO: 6.4 10E3/UL (ref 4–11)

## 2025-01-14 PROCEDURE — 93005 ELECTROCARDIOGRAM TRACING: CPT | Performed by: FAMILY MEDICINE

## 2025-01-14 PROCEDURE — 99285 EMERGENCY DEPT VISIT HI MDM: CPT | Mod: 25 | Performed by: FAMILY MEDICINE

## 2025-01-14 PROCEDURE — 250N000013 HC RX MED GY IP 250 OP 250 PS 637: Performed by: FAMILY MEDICINE

## 2025-01-14 PROCEDURE — 85379 FIBRIN DEGRADATION QUANT: CPT | Performed by: FAMILY MEDICINE

## 2025-01-14 PROCEDURE — 80048 BASIC METABOLIC PNL TOTAL CA: CPT | Performed by: FAMILY MEDICINE

## 2025-01-14 PROCEDURE — 93010 ELECTROCARDIOGRAM REPORT: CPT | Performed by: FAMILY MEDICINE

## 2025-01-14 PROCEDURE — 84484 ASSAY OF TROPONIN QUANT: CPT | Performed by: FAMILY MEDICINE

## 2025-01-14 PROCEDURE — 36415 COLL VENOUS BLD VENIPUNCTURE: CPT | Performed by: FAMILY MEDICINE

## 2025-01-14 PROCEDURE — 83690 ASSAY OF LIPASE: CPT | Performed by: FAMILY MEDICINE

## 2025-01-14 PROCEDURE — 85025 COMPLETE CBC W/AUTO DIFF WBC: CPT | Performed by: FAMILY MEDICINE

## 2025-01-14 PROCEDURE — 71046 X-RAY EXAM CHEST 2 VIEWS: CPT

## 2025-01-14 RX ORDER — ASPIRIN 81 MG/1
324 TABLET, CHEWABLE ORAL ONCE
Status: COMPLETED | OUTPATIENT
Start: 2025-01-14 | End: 2025-01-14

## 2025-01-14 RX ORDER — ACETAMINOPHEN 325 MG/1
650 TABLET ORAL EVERY 6 HOURS PRN
Status: DISCONTINUED | OUTPATIENT
Start: 2025-01-14 | End: 2025-01-14 | Stop reason: HOSPADM

## 2025-01-14 RX ORDER — NITROGLYCERIN 0.4 MG/1
0.4 TABLET SUBLINGUAL EVERY 5 MIN PRN
Status: DISCONTINUED | OUTPATIENT
Start: 2025-01-14 | End: 2025-01-14 | Stop reason: HOSPADM

## 2025-01-14 RX ADMIN — ASPIRIN 81 MG 324 MG: 81 TABLET ORAL at 14:06

## 2025-01-14 ASSESSMENT — ACTIVITIES OF DAILY LIVING (ADL)
ADLS_ACUITY_SCORE: 41

## 2025-01-14 ASSESSMENT — COLUMBIA-SUICIDE SEVERITY RATING SCALE - C-SSRS
6. HAVE YOU EVER DONE ANYTHING, STARTED TO DO ANYTHING, OR PREPARED TO DO ANYTHING TO END YOUR LIFE?: NO
1. IN THE PAST MONTH, HAVE YOU WISHED YOU WERE DEAD OR WISHED YOU COULD GO TO SLEEP AND NOT WAKE UP?: NO
2. HAVE YOU ACTUALLY HAD ANY THOUGHTS OF KILLING YOURSELF IN THE PAST MONTH?: NO

## 2025-01-14 NOTE — DISCHARGE INSTRUCTIONS
ICD-10-CM    1. Orthostasis  I95.1     stay close to a chair or other support for a fuill 2-3 minutes after standing. stay hydrated with 64 oz fluid.      2. Other chest pain  R07.89     unclear cause. reassuring findings.  take prilosec 20 mg orally daily for 4 weeks. you may use tums as needed.  stresss testing as scheduled and follow-yup clinic      '

## 2025-01-14 NOTE — ED TRIAGE NOTES
Pt reports left anterior chest pain on/off for one week. Pt gets dizzy and light headed with the pain. When the pain comes it's 8/10.      Triage Assessment (Adult)       Row Name 01/14/25 1222          Triage Assessment    Airway WDL WDL        Respiratory WDL    Respiratory WDL WDL        Cardiac WDL    Cardiac WDL X;chest pain        Cognitive/Neuro/Behavioral WDL    Cognitive/Neuro/Behavioral WDL WDL

## 2025-01-14 NOTE — ED PROVIDER NOTES
History     Chief Complaint   Patient presents with    Chest Pain     On/off for one week     HPI  Paz Workman is a 80 year old female who presents with left-sided anterior chest pain as well as generalized chest pain across the lower chest and not of the abdomen that she is attributed to gastroesophageal reflux and has taken Tums without effect.  No associated dyspnea or tachycardia no diaphoresis.  No significant nausea or vomiting or abdominal pain.   Radiation into the left arm.  Was exertional today.  Arrived here and noted sporadic pain at 1230 and onward but also earlier today as well.  She was referred by her primary care clinic for further evaluation of chest pain after ED visit at 11:00 today.  She has no associated fever cough congestion upper respiratory symptoms she has no known VTE risk.  She did have prior endometrial cancer but no active cancer.    Denies recent prolonged travel (>3 hours) by car or plane, history or FHx of venous thromboembolism, recent surgery (last 4 weeks), active cancer history, hypercoagulable state, estrogen or other medications/conditions causing VTE or  new unilateral swelling or pain in the legs or calves.    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      Abnormal EKG 2016     Priority: Medium     Overview:    2016 EKG, referred to cardiologist and plan is for angiography 2016-->      Hyperlipidemia with target low density lipoprotein (LDL) cholesterol less than 160 mg/dL 2013     Priority: Medium     Overview:    2015, 10 year risk: 5%, lifestyle modification       Last Lipids:   Chol: 213    2015   T    2015   HDL:   44    2015   LDL:  143    2015   LDL CHOLESTEROL (mg/dL)    Date Value    2015 143*      Senile osteoporosis 2013     Priority: Medium     Overview:    2011 ZOMETA Q 6 MONTHS, discontinued 2014 Alandronate and no heart burn this time around (had heart burn before)   Last DEXA: , T-Score -2.4 with scoliosis.  Somewhat improved compared to DEXA from       Brain lesion 2011     Priority: Medium     Overview:    5 x 7 enhancing lesion c/w small en plaque mengioma on 11 MRI done at [facility]. Patient wanted us to observe with f/u MRI in 3-6 months (Feb-May 2012).   : Patient has decided not to pursue this further which is ok especially as she is currently not having any symptoms and is not planning to do anything about it even it was cancer (primary of metastasis). Given warning signs that may indicate an increase in size upon which, she will let me know      Degeneration of lumbar or lumbosacral intervertebral disc 2008     Priority: Medium     Overview:    With DJD. Stable staying active has helped and she really is not in pain.      Osteoarthrosis, hand 2008     Priority: Medium     Overview:    Stable          Past Medical History:    No past medical history on file.    Past Surgical History:    Past Surgical History:   Procedure Laterality Date    cataract surgey Bilateral  "07/01/2021    COLPORRHAPHY ANTERIOR Bilateral 1/5/2023    Procedure: anterior repair;  Surgeon: Ellie Mendoza MD;  Location: [facility] OR    LAPAROSCOPIC ASSISTED HYSTERECTOMY VAGINAL, BILATERAL SALPINGO-OOPHORECTOMY, COMBINED Bilateral 1/5/2023    Procedure: HYSTERECTOMY, VAGINAL, WITH SALPINGO-OOPHORECTOMY, Bilateral and;  Surgeon: Ellie Mendoza MD;  Location: [facility] OR       Family History:    No family history on file.    Social History:  Marital Status:   [2]  Social History     Tobacco Use    Smoking status: Never    Smokeless tobacco: Never   Vaping Use    Vaping status: Never Used   Substance Use Topics    Alcohol use: Yes     Comment: occ    Drug use: Never        Medications:    acetaminophen (TYLENOL) 325 MG tablet  amLODIPine (NORVASC) 5 MG tablet  CALCIUM-MAGNESIUM-ZINC PO  Cranberry 600 MG TABS  Docusate Sodium (STOOL SOFTENER LAXATIVE PO)  Glucosamine HCl 1500 MG TABS  Lutein-Zeaxanthin 6-1 MG TABS  meclizine (ANTIVERT) 25 MG tablet  Multiple Vitamin (MULTI-VITAMINS) TABS  Polyethyl Glycol-Propyl Glycol (SYSTANE OP)  polyethylene glycol (MIRALAX) 17 g packet          Review of Systems  ROS:  5 point ROS negative except as noted above in HPI, including Gen., Resp., CV, GI &  system review.    Physical Exam   BP: (!) 152/90  Pulse: 78  Temp: 98.6  F (37  C)  Resp: 18  Height: 154.9 cm (5' 1\")  Weight: 56.7 kg (125 lb)  SpO2: 96 %      Physical Exam  Constitutional:       General: She is in acute distress.      Appearance: She is not diaphoretic.   HENT:      Head: Atraumatic.   Eyes:      Conjunctiva/sclera: Conjunctivae normal.   Cardiovascular:      Rate and Rhythm: Normal rate and regular rhythm.      Pulses: Normal pulses.      Heart sounds: No murmur heard.  Pulmonary:      Effort: No respiratory distress.      Breath sounds: No stridor. No wheezing or rhonchi.   Abdominal:      General: Abdomen is flat. There is no distension.      Palpations: Abdomen is soft. There is no mass. "      Tenderness: There is no abdominal tenderness. There is no guarding.   Musculoskeletal:      Cervical back: Neck supple.      Left lower leg: No edema.   Skin:     Coloration: Skin is not pale.      Findings: No rash.   Neurological:      Mental Status: She is alert.           Radial pulses are symmetric.    ED Course        Procedures                EKG Interpretation:      Interpreted by Isrrael Conley MD  EKG done at 1228 hrs. demonstrates a sinus rhythm 64 bpm normal axis.  No ST change.  No T wave changes.  There is normal R progression no Q waves.  Normal intervals.  Normal conduction.  No ectopy.  Impression sinus rhythm 64 bpm no acute change.    Critical Care time:  none              Results for orders placed or performed during the hospital encounter of 01/14/25 (from the past 24 hours)   EKG 12-lead, tracing only   Result Value Ref Range    Systolic Blood Pressure  mmHg    Diastolic Blood Pressure  mmHg    Ventricular Rate 64 BPM    Atrial Rate 64 BPM    HI Interval 180 ms    QRS Duration 72 ms     ms    QTc 418 ms    P Axis -21 degrees    R AXIS -8 degrees    T Axis 43 degrees    Interpretation ECG       Sinus rhythm  Normal ECG  No previous ECGs available     CBC with platelets differential    Narrative    The following orders were created for panel order CBC with platelets differential.  Procedure                               Abnormality         Status                     ---------                               -----------         ------                     CBC with platelets and d...[[id number]]                      Final result                 Please view results for these tests on the individual orders.   Comprehensive metabolic panel   Result Value Ref Range    Sodium 136 135 - 145 mmol/L    Potassium 3.9 3.4 - 5.3 mmol/L    Carbon Dioxide (CO2) 27 22 - 29 mmol/L    Anion Gap 8 7 - 15 mmol/L    Urea Nitrogen 17.4 8.0 - 23.0 mg/dL    Creatinine 0.96 (H) 0.51 - 0.95 mg/dL    GFR Estimate  60 (L) >60 mL/min/1.73m2    Calcium 10.1 8.8 - 10.4 mg/dL    Chloride 101 98 - 107 mmol/L    Glucose 112 (H) 70 - 99 mg/dL    Alkaline Phosphatase 64 40 - 150 U/L    AST 25 0 - 45 U/L    ALT 20 0 - 50 U/L    Protein Total 7.1 6.4 - 8.3 g/dL    Albumin 4.2 3.5 - 5.2 g/dL    Bilirubin Total 0.4 <=1.2 mg/dL   Lipase   Result Value Ref Range    Lipase 23 13 - 60 U/L   Troponin T, High Sensitivity   Result Value Ref Range    Troponin T, High Sensitivity 9 <=14 ng/L   CBC with platelets and differential   Result Value Ref Range    WBC Count 6.4 4.0 - 11.0 10e3/uL    RBC Count 4.13 3.80 - 5.20 10e6/uL    Hemoglobin 12.8 11.7 - 15.7 g/dL    Hematocrit 39.2 35.0 - 47.0 %    MCV 95 78 - 100 fL    MCH 31.0 26.5 - 33.0 pg    MCHC 32.7 31.5 - 36.5 g/dL    RDW 12.7 10.0 - 15.0 %    Platelet Count 205 150 - 450 10e3/uL    % Neutrophils 52 %    % Lymphocytes 38 %    % Monocytes 8 %    % Eosinophils 1 %    % Basophils 1 %    % Immature Granulocytes 0 %    NRBCs per 100 WBC 0 <1 /100    Absolute Neutrophils 3.3 1.6 - 8.3 10e3/uL    Absolute Lymphocytes 2.4 0.8 - 5.3 10e3/uL    Absolute Monocytes 0.5 0.0 - 1.3 10e3/uL    Absolute Eosinophils 0.1 0.0 - 0.7 10e3/uL    Absolute Basophils 0.1 0.0 - 0.2 10e3/uL    Absolute Immature Granulocytes 0.0 <=0.4 10e3/uL    Absolute NRBCs 0.0 10e3/uL   [place] Draw    Narrative    The following orders were created for panel order [place] Draw.  Procedure                               Abnormality         Status                     ---------                               -----------         ------                     Extra Blue Top Tube[[id number]]                              Final result                 Please view results for these tests on the individual orders.   Extra Blue Top Tube   Result Value Ref Range    Hold Specimen JI    D dimer quantitative   Result Value Ref Range    D-Dimer Quantitative 0.64 (H) 0.00 - 0.50 ug/mL FEU    Narrative    This D-dimer assay is intended for use in  conjunction with a clinical pretest probability assessment model to exclude pulmonary embolism (PE) and deep venous thrombosis (DVT) in outpatients suspected of PE or DVT. The cut-off value is 0.50 ug/mL FEU.    For patients 50 years of age or older, the application of age-adjusted cut-off values for D-Dimer may increase the specificity without significant effect on sensitivity. The literature suggested calculation age adjusted cut-off in ug/L = age in years x 10 ug/L. The results in this laboratory are reported as ug/mL rather than ug/L. The calculation for age adjusted cut off in ug/mL= age in years x 0.01 ug/mL. For example, the cut off for a 76 year old male is 76 x 0.01 ug/mL = 0.76 ug/mL (760 ug/L).    M Marco Antonio et al. Age adjusted D-dimer cut-off levels to rule out pulmonary embolism: The ADJUST-PE Study. ISIDRA 2014;311:6026-1457.; HJ Zach et al. Diagnostic accuracy of conventional or age adjusted D-dimer cutoff values in older patients with suspected venous thromboembolism. Systemic review and meta-analysis. BMJ 2013:346:f2492.   XR Chest 2 Views    Narrative    EXAM: XR CHEST 2 VIEWS  LOCATION: [facility]  DATE: 1/14/2025    INDICATION: Chest pain.  COMPARISON: None.      Impression    IMPRESSION: No acute infiltrates or effusions.   Troponin T, High Sensitivity   Result Value Ref Range    Troponin T, High Sensitivity 10 <=14 ng/L       Medications   aspirin (ASA) chewable tablet 324 mg (has no administration in time range)   nitroGLYcerin (NITROSTAT) sublingual tablet 0.4 mg (has no administration in time range)   acetaminophen (TYLENOL) tablet 650 mg (has no administration in time range)       Assessments & Plan (with Medical Decision Making)     CHARLY; Paz Workman is a 80 year old female presents with ongoing chest pain that she attributes to gastroesophageal reflux.  She has no known VTE risk.  Pain was exertional this morning.  There is no associated shortness of breath  diaphoresis nausea vomiting.  Nitroglycerin and aspirin given.  She does have an ibuprofen reaction with swelling and will watch closely for any signs of allergic reaction.  Plan broad-based evaluation for chest pain.  Findings are reassuring on testing.  This did include a D-dimer which was negative for age.  The remainder of her testing again reassuring.  We discussed management as below and following up for stress testing as already scheduled and precautions for return.  Patient had no pain at the time nitroglycerin was ordered.    I have reviewed the nursing notes.    I have reviewed the findings, diagnosis, plan and need for follow up with the patient.           Medical Decision Making  The patient's presentation was of moderate complexity (an acute illness with systemic symptoms).    The patient's evaluation involved:  ordering and/or review of 3+ test(s) in this encounter (see separate area of note for details)    The patient's management necessitated moderate risk (prescription drug management including medications given in the ED).        New Prescriptions    No medications on file       Final diagnoses:   Orthostasis - stay close to a chair or other support for a fuill 2-3 minutes after standing. stay hydrated with 64 oz fluid.   Other chest pain - unclear cause. reassuring findings.  take prilosec 20 mg orally daily for 4 weeks. you may use tums as needed.  stresss testing as scheduled and follow-[facility] clinic       1/14/2025   [facility] EMERGENCY DEPT       Isrrael Conley MD  01/14/25 6259

## 2025-01-15 LAB
ATRIAL RATE - MUSE: 64 BPM
DIASTOLIC BLOOD PRESSURE - MUSE: NORMAL MMHG
INTERPRETATION ECG - MUSE: NORMAL
P AXIS - MUSE: -21 DEGREES
PR INTERVAL - MUSE: 180 MS
QRS DURATION - MUSE: 72 MS
QT - MUSE: 406 MS
QTC - MUSE: 418 MS
R AXIS - MUSE: -8 DEGREES
SYSTOLIC BLOOD PRESSURE - MUSE: NORMAL MMHG
T AXIS - MUSE: 43 DEGREES
VENTRICULAR RATE- MUSE: 64 BPM

## 2025-02-05 ENCOUNTER — OFFICE VISIT (OUTPATIENT)
Dept: OBGYN | Facility: CLINIC | Age: 80
End: 2025-02-05
Payer: COMMERCIAL

## 2025-02-05 VITALS
WEIGHT: 120 LBS | DIASTOLIC BLOOD PRESSURE: 79 MMHG | HEIGHT: 61 IN | SYSTOLIC BLOOD PRESSURE: 156 MMHG | BODY MASS INDEX: 22.66 KG/M2 | TEMPERATURE: 97.6 F | RESPIRATION RATE: 16 BRPM | HEART RATE: 74 BPM

## 2025-02-05 DIAGNOSIS — Z46.89 PESSARY MAINTENANCE: Primary | ICD-10-CM

## 2025-02-05 RX ORDER — ASPIRIN 81 MG/1
81 TABLET ORAL
COMMUNITY
Start: 2025-01-14

## 2025-02-05 NOTE — NURSING NOTE
"Initial BP (!) 156/79 (BP Location: Left arm, Patient Position: Chair, Cuff Size: Adult Regular)   Pulse 74   Temp 97.6  F (36.4  C) (Tympanic)   Resp 16   Ht 1.549 m (5' 1\")   Wt 54.4 kg (120 lb)   LMP  (LMP Unknown)   BMI 22.67 kg/m   Estimated body mass index is 22.67 kg/m  as calculated from the following:    Height as of this encounter: 1.549 m (5' 1\").    Weight as of this encounter: 54.4 kg (120 lb). .    Daisha Gregorio, SANDRA        "

## 2025-02-05 NOTE — PROGRESS NOTES
"Pessary follow-up visit.    Paz Workman is not complaining of any problems with the pessary. She is here for it to be removed, cleaned and reinserted.     BP (!) 156/79 (BP Location: Left arm, Patient Position: Chair, Cuff Size: Adult Regular)   Pulse 74   Temp 97.6  F (36.4  C) (Tympanic)   Resp 16   Ht 1.549 m (5' 1\")   Wt 54.4 kg (120 lb)   LMP  (LMP Unknown)   BMI 22.67 kg/m       EXAM:    General Appearance: Pleasant, alert, appropriate appearance for age. No acute distress  Head Exam: Normocephalic, without obvious abnormality.  Genitourinary Exam Female:   External genitalia: atrophic vulva  Urinary system: urethral meatus normal  Vagina: mucosa atrophic and pale, no excoriations or ulcerations noted.    The size 2 ring pessary was removed, cleaned and reinserted without issue.    IMP: Pelvic prolapse - doing well with pessary.    Plan: Follow up in  6 months.    Ellie Mendoza MD on 2/5/2025 at 11:04 AM      "

## 2025-03-09 ENCOUNTER — HEALTH MAINTENANCE LETTER (OUTPATIENT)
Age: 80
End: 2025-03-09

## 2025-08-05 ENCOUNTER — OFFICE VISIT (OUTPATIENT)
Dept: OBGYN | Facility: CLINIC | Age: 80
End: 2025-08-05
Payer: COMMERCIAL

## 2025-08-05 ENCOUNTER — TELEPHONE (OUTPATIENT)
Dept: OBGYN | Facility: CLINIC | Age: 80
End: 2025-08-05

## 2025-08-05 VITALS
WEIGHT: 128 LBS | SYSTOLIC BLOOD PRESSURE: 128 MMHG | HEART RATE: 70 BPM | BODY MASS INDEX: 24.17 KG/M2 | DIASTOLIC BLOOD PRESSURE: 69 MMHG | HEIGHT: 61 IN

## 2025-08-05 DIAGNOSIS — N95.2 VAGINAL ATROPHY: ICD-10-CM

## 2025-08-05 DIAGNOSIS — Z46.89 PESSARY MAINTENANCE: Primary | ICD-10-CM

## 2025-08-05 PROCEDURE — 3078F DIAST BP <80 MM HG: CPT | Performed by: PHYSICIAN ASSISTANT

## 2025-08-05 PROCEDURE — 99213 OFFICE O/P EST LOW 20 MIN: CPT | Performed by: PHYSICIAN ASSISTANT

## 2025-08-05 PROCEDURE — 3074F SYST BP LT 130 MM HG: CPT | Performed by: PHYSICIAN ASSISTANT

## 2025-08-05 PROCEDURE — 99459 PELVIC EXAMINATION: CPT | Performed by: PHYSICIAN ASSISTANT

## 2025-08-05 RX ORDER — ESTRADIOL 0.1 MG/G
CREAM VAGINAL
Qty: 42.5 G | Refills: 2 | Status: SHIPPED | OUTPATIENT
Start: 2025-08-07

## 2025-08-05 RX ORDER — FAMOTIDINE 20 MG
TABLET ORAL
COMMUNITY

## (undated) DEVICE — PACK LAPAROSCOPY/PELVISCOPY STD

## (undated) DEVICE — PACK LAPAROTOMY CUSTOM LAKES

## (undated) DEVICE — SYSTEM LAPAROVUE VISIBILITY LAPVUE10

## (undated) DEVICE — DRAPE POUCH INSTRUMENT 3 POCKET 1018L

## (undated) DEVICE — SU VICRYL 4-0 FS-2 27" J422-H

## (undated) DEVICE — SU VICRYL 2-0 CT-1 27" UND J259H

## (undated) DEVICE — CATH TRAY FOLEY SURESTEP 16FR W/URINE MTR STATLK LF A303416A

## (undated) DEVICE — ENDO TROCAR SLEEVE KII ADV FIXATION 05X100MM CFS02

## (undated) DEVICE — TUBING SUCTION 12"X1/4" N612

## (undated) DEVICE — DRAPE VAGI BAG 18X9" 1072

## (undated) DEVICE — BLADE KNIFE SURG 11 371111

## (undated) DEVICE — SUCTION TIP YANKAUER STR K87

## (undated) DEVICE — ESU HOLSTER PLASTIC DISP E2400

## (undated) DEVICE — PAD PERI INDIV WRAP 11" 2022

## (undated) DEVICE — PREP CHLORHEXIDINE 4% 4OZ (HIBICLENS) 57504

## (undated) DEVICE — TUBING CYSTO/BLADDER IRRIG SET 80" 06544-01

## (undated) DEVICE — SUCTION IRR STRYKERFLOW II W/TIP 250-070-520

## (undated) DEVICE — ADH SKIN CLOSURE PREMIERPRO EXOFIN 1.0ML 3470

## (undated) DEVICE — SU VICRYL 0 CT-1 36" J946H

## (undated) DEVICE — GLOVE PROTEXIS W/NEU-THERA 6.5  2D73TE65

## (undated) DEVICE — ENDO TROCAR FIRST ENTRY KII FIOS ADV FIX 05X100MM CFF03

## (undated) DEVICE — PREP SKIN SCRUB TRAY 4461A

## (undated) DEVICE — SOL NACL 0.9% IRRIG 1000ML BOTTLE 07138-09

## (undated) DEVICE — SOL NACL 0.9% IRRIG 3000ML BAG 07972-08

## (undated) DEVICE — SPONGE PACK VAGINAL 4X36"

## (undated) DEVICE — ESU LIGASURE MARYLAND LAPAROSCOPIC SLR/DVDR 5MMX37CM LF1937

## (undated) DEVICE — STOCKING SLEEVE COMPRESSION CALF MED

## (undated) DEVICE — SOL WATER IRRIG 1000ML BOTTLE 07139-09

## (undated) DEVICE — UTERINE MANIPULATOR HUMI KRONER 6003

## (undated) DEVICE — DECANTER VIAL 2006S

## (undated) DEVICE — NDL INSUFFLATION 13GA 120MM C2201

## (undated) DEVICE — GLOVE PROTEXIS BLUE W/NEU-THERA 7.0  2D73EB70

## (undated) DEVICE — GOWN LG DISP 9515

## (undated) DEVICE — SYR 10ML LL W/O NDL

## (undated) DEVICE — SU VICRYL 0 CT-2 CR 8X18" J727D

## (undated) DEVICE — TUBING INSUFFLATION W/FILTER CPC TO LUER 620-030-301

## (undated) DEVICE — SUCTION MANIFOLD NEPTUNE 2 SYS 1 PORT 702-025-000

## (undated) DEVICE — ANTIFOG SOLUTION W/FOAM PAD 31142527

## (undated) RX ORDER — FENTANYL CITRATE-0.9 % NACL/PF 10 MCG/ML
PLASTIC BAG, INJECTION (ML) INTRAVENOUS
Status: DISPENSED
Start: 2023-01-05

## (undated) RX ORDER — DEXAMETHASONE SODIUM PHOSPHATE 4 MG/ML
INJECTION, SOLUTION INTRA-ARTICULAR; INTRALESIONAL; INTRAMUSCULAR; INTRAVENOUS; SOFT TISSUE
Status: DISPENSED
Start: 2023-01-05

## (undated) RX ORDER — CEFAZOLIN SODIUM/WATER 2 G/20 ML
SYRINGE (ML) INTRAVENOUS
Status: DISPENSED
Start: 2023-01-05

## (undated) RX ORDER — GINSENG 100 MG
CAPSULE ORAL
Status: DISPENSED
Start: 2023-01-05

## (undated) RX ORDER — ONDANSETRON 2 MG/ML
INJECTION INTRAMUSCULAR; INTRAVENOUS
Status: DISPENSED
Start: 2023-01-05

## (undated) RX ORDER — PROPOFOL 10 MG/ML
INJECTION, EMULSION INTRAVENOUS
Status: DISPENSED
Start: 2023-01-05

## (undated) RX ORDER — FENTANYL CITRATE 50 UG/ML
INJECTION, SOLUTION INTRAMUSCULAR; INTRAVENOUS
Status: DISPENSED
Start: 2023-01-05

## (undated) RX ORDER — LIDOCAINE HCL/EPINEPHRINE/PF 2%-1:200K
VIAL (ML) INJECTION
Status: DISPENSED
Start: 2023-01-05

## (undated) RX ORDER — GABAPENTIN 100 MG/1
CAPSULE ORAL
Status: DISPENSED
Start: 2023-01-05

## (undated) RX ORDER — LIDOCAINE HYDROCHLORIDE AND EPINEPHRINE 10; 10 MG/ML; UG/ML
INJECTION, SOLUTION INFILTRATION; PERINEURAL
Status: DISPENSED
Start: 2023-01-05

## (undated) RX ORDER — BUPIVACAINE HYDROCHLORIDE AND EPINEPHRINE 2.5; 5 MG/ML; UG/ML
INJECTION, SOLUTION EPIDURAL; INFILTRATION; INTRACAUDAL; PERINEURAL
Status: DISPENSED
Start: 2023-01-05

## (undated) RX ORDER — LIDOCAINE HYDROCHLORIDE 10 MG/ML
INJECTION, SOLUTION EPIDURAL; INFILTRATION; INTRACAUDAL; PERINEURAL
Status: DISPENSED
Start: 2023-01-05

## (undated) RX ORDER — ACETAMINOPHEN 325 MG/1
TABLET ORAL
Status: DISPENSED
Start: 2023-01-05